# Patient Record
Sex: MALE | Race: BLACK OR AFRICAN AMERICAN | NOT HISPANIC OR LATINO | URBAN - METROPOLITAN AREA
[De-identification: names, ages, dates, MRNs, and addresses within clinical notes are randomized per-mention and may not be internally consistent; named-entity substitution may affect disease eponyms.]

---

## 2020-11-02 ENCOUNTER — OUTPATIENT (OUTPATIENT)
Dept: OUTPATIENT SERVICES | Facility: HOSPITAL | Age: 50
LOS: 1 days | Discharge: HOME | End: 2020-11-02
Payer: COMMERCIAL

## 2020-11-02 VITALS
DIASTOLIC BLOOD PRESSURE: 83 MMHG | WEIGHT: 315 LBS | TEMPERATURE: 98 F | HEART RATE: 73 BPM | HEIGHT: 74 IN | SYSTOLIC BLOOD PRESSURE: 138 MMHG | OXYGEN SATURATION: 100 %

## 2020-11-02 DIAGNOSIS — K43.6 OTHER AND UNSPECIFIED VENTRAL HERNIA WITH OBSTRUCTION, WITHOUT GANGRENE: ICD-10-CM

## 2020-11-02 DIAGNOSIS — I10 ESSENTIAL (PRIMARY) HYPERTENSION: ICD-10-CM

## 2020-11-02 DIAGNOSIS — Z01.818 ENCOUNTER FOR OTHER PREPROCEDURAL EXAMINATION: ICD-10-CM

## 2020-11-02 DIAGNOSIS — E66.9 OBESITY, UNSPECIFIED: ICD-10-CM

## 2020-11-02 LAB
ANION GAP SERPL CALC-SCNC: 7 MMOL/L — SIGNIFICANT CHANGE UP (ref 5–17)
APTT BLD: 28.5 SEC — SIGNIFICANT CHANGE UP (ref 27.5–35.5)
BUN SERPL-MCNC: 13 MG/DL — SIGNIFICANT CHANGE UP (ref 7–23)
CALCIUM SERPL-MCNC: 8.9 MG/DL — SIGNIFICANT CHANGE UP (ref 8.5–10.1)
CHLORIDE SERPL-SCNC: 109 MMOL/L — HIGH (ref 96–108)
CO2 SERPL-SCNC: 26 MMOL/L — SIGNIFICANT CHANGE UP (ref 22–31)
CREAT SERPL-MCNC: 1.08 MG/DL — SIGNIFICANT CHANGE UP (ref 0.5–1.3)
GLUCOSE SERPL-MCNC: 98 MG/DL — SIGNIFICANT CHANGE UP (ref 70–99)
HCT VFR BLD CALC: 42 % — SIGNIFICANT CHANGE UP (ref 39–50)
HGB BLD-MCNC: 13.5 G/DL — SIGNIFICANT CHANGE UP (ref 13–17)
INR BLD: 0.96 RATIO — SIGNIFICANT CHANGE UP (ref 0.88–1.16)
MCHC RBC-ENTMCNC: 30.2 PG — SIGNIFICANT CHANGE UP (ref 27–34)
MCHC RBC-ENTMCNC: 32.1 GM/DL — SIGNIFICANT CHANGE UP (ref 32–36)
MCV RBC AUTO: 94 FL — SIGNIFICANT CHANGE UP (ref 80–100)
NRBC # BLD: 0 /100 WBCS — SIGNIFICANT CHANGE UP (ref 0–0)
PLATELET # BLD AUTO: 218 K/UL — SIGNIFICANT CHANGE UP (ref 150–400)
POTASSIUM SERPL-MCNC: 4.3 MMOL/L — SIGNIFICANT CHANGE UP (ref 3.5–5.3)
POTASSIUM SERPL-SCNC: 4.3 MMOL/L — SIGNIFICANT CHANGE UP (ref 3.5–5.3)
PROTHROM AB SERPL-ACNC: 11.2 SEC — SIGNIFICANT CHANGE UP (ref 10.6–13.6)
RBC # BLD: 4.47 M/UL — SIGNIFICANT CHANGE UP (ref 4.2–5.8)
RBC # FLD: 13.4 % — SIGNIFICANT CHANGE UP (ref 10.3–14.5)
SODIUM SERPL-SCNC: 142 MMOL/L — SIGNIFICANT CHANGE UP (ref 135–145)
WBC # BLD: 6.14 K/UL — SIGNIFICANT CHANGE UP (ref 3.8–10.5)
WBC # FLD AUTO: 6.14 K/UL — SIGNIFICANT CHANGE UP (ref 3.8–10.5)

## 2020-11-02 PROCEDURE — 93010 ELECTROCARDIOGRAM REPORT: CPT

## 2020-11-02 RX ORDER — SODIUM CHLORIDE 9 MG/ML
3 INJECTION INTRAMUSCULAR; INTRAVENOUS; SUBCUTANEOUS EVERY 8 HOURS
Refills: 0 | Status: DISCONTINUED | OUTPATIENT
Start: 2020-11-16 | End: 2020-11-16

## 2020-11-02 NOTE — H&P PST ADULT - NSICDXPROBLEM_GEN_ALL_CORE_FT
No
PROBLEM DIAGNOSES  Problem: Other and unspecified ventral hernia with obstruction, without gangrene  Assessment and Plan: repair incarcerated ventral hernia with mesh  Pre-op instructions given by RN, patient verbalized understanding  Chlorhexidine wash instructions given     Problem: HTN (hypertension)  Assessment and Plan: Continue current regimen and medications.     Problem: Obesity  Assessment and Plan: KATHERINE percautions

## 2020-11-02 NOTE — H&P PST ADULT - REASON FOR ADMISSION
ventral hernia repair Post-Care Instructions: I reviewed with the patient in detail post-care instructions. Patient is to wear sunprotection, and avoid picking at any of the treated lesions. Pt may apply Vaseline to crusted or scabbing areas. Render Note In Bullet Format When Appropriate: No Number Of Freeze-Thaw Cycles: 1 freeze-thaw cycle Consent: The patient's consent was obtained including but not limited to risks of crusting, scabbing, blistering, scarring, darker or lighter pigmentary change, recurrence, incomplete removal and infection. Duration Of Freeze Thaw-Cycle (Seconds): 3 Detail Level: Simple

## 2020-11-02 NOTE — H&P PST ADULT - HISTORY OF PRESENT ILLNESS
50M pmh htn, obesity c/o abdominal discomfort and swelling fond to have ventral hernia here for PST for scheduled repair of incarcerated ventral hernia with mesh  This patient denies any fever, cough, sob, flu like symptoms or travel outside of the [place] in the past 30 days

## 2020-11-02 NOTE — H&P PST ADULT - ASSESSMENT
50M pmh htn, obesity c/o abdominal discomfort and swelling fond to have ventral hernia here for PST for scheduled repair of incarcerated ventral hernia with mesh  CAPRINI SCORE    AGE RELATED RISK FACTORS                                                       MOBILITY RELATED FACTORS  [x ] Age 41-60 years                                            (1 Point)                  [ ] Bed rest                                                        (1 Point)  [ ] Age: 61-74 years                                           (2 Points)                [ ] Plaster cast                                                   (2 Points)  [ ] Age= 75 years                                              (3 Points)                 [ ] Bed bound for more than 72 hours                   (2 Points)    DISEASE RELATED RISK FACTORS                                               GENDER SPECIFIC FACTORS  [ ] Edema in the lower extremities                       (1 Point)                  [ ] Pregnancy                                                     (1 Point)  [ ] Varicose veins                                               (1 Point)                  [ ] Post-partum < 6 weeks                                   (1 Point)             [ x] BMI > 25 Kg/m2                                            (1 Point)                  [ ] Hormonal therapy  or oral contraception            (1 Point)                 [ ] Sepsis (in the previous month)                        (1 Point)                  [ ] History of pregnancy complications  [ ] Pneumonia or serious lung disease                                               [ ] Unexplained or recurrent                       (1 Point)           (in the previous month)                               (1 Point)  [ ] Abnormal pulmonary function test                     (1 Point)                 SURGERY RELATED RISK FACTORS  [ ] Acute myocardial infarction                              (1 Point)                 [ ]  Section                                            (1 Point)  [ ] Congestive heart failure (in the previous month)  (1 Point)                 [ ] Minor surgery                                                 (1 Point)   [ ] Inflammatory bowel disease                             (1 Point)                 [ ] Arthroscopic surgery                                        (2 Points)  [ ] Central venous access                                    (2 Points)                [ x] General surgery lasting more than 45 minutes   (2 Points)       [ ] Stroke (in the previous month)                          (5 Points)               [ ] Elective arthroplasty                                        (5 Points)                                                                                                                                               HEMATOLOGY RELATED FACTORS                                                 TRAUMA RELATED RISK FACTORS  [ ] Prior episodes of VTE                                     (3 Points)                 [ ] Fracture of the hip, pelvis, or leg                       (5 Points)  [ ] Positive family history for VTE                         (3 Points)                 [ ] Acute spinal cord injury (in the previous month)  (5 Points)  [ ] Prothrombin 74663 A                                      (3 Points)                 [ ] Paralysis  (less than 1 month)                          (5 Points)  [ ] Factor V Leiden                                             (3 Points)                 [ ] Multiple Trauma within 1 month                         (5 Points)  [ ] Lupus anticoagulants                                     (3 Points)                                                           [ ] Anticardiolipin antibodies                                (3 Points)                                                       [ ] High homocysteine in the blood                      (3 Points)                                             [ ] Other congenital or acquired thrombophilia       (3 Points)                                                [ ] Heparin induced thrombocytopenia                  (3 Points)                                          Total Score [       4   ]

## 2020-11-08 DIAGNOSIS — Z01.818 ENCOUNTER FOR OTHER PREPROCEDURAL EXAMINATION: ICD-10-CM

## 2020-11-08 PROBLEM — Z00.00 ENCOUNTER FOR PREVENTIVE HEALTH EXAMINATION: Status: ACTIVE | Noted: 2020-11-08

## 2020-11-13 ENCOUNTER — APPOINTMENT (OUTPATIENT)
Dept: DISASTER EMERGENCY | Facility: CLINIC | Age: 50
End: 2020-11-13

## 2020-11-15 ENCOUNTER — TRANSCRIPTION ENCOUNTER (OUTPATIENT)
Age: 50
End: 2020-11-15

## 2020-11-15 LAB — SARS-COV-2 N GENE NPH QL NAA+PROBE: NOT DETECTED

## 2020-11-16 ENCOUNTER — OUTPATIENT (OUTPATIENT)
Dept: OUTPATIENT SERVICES | Facility: HOSPITAL | Age: 50
LOS: 1 days | Discharge: HOME | End: 2020-11-16
Payer: COMMERCIAL

## 2020-11-16 ENCOUNTER — RESULT REVIEW (OUTPATIENT)
Age: 50
End: 2020-11-16

## 2020-11-16 VITALS
TEMPERATURE: 98 F | SYSTOLIC BLOOD PRESSURE: 127 MMHG | WEIGHT: 315 LBS | HEIGHT: 74 IN | OXYGEN SATURATION: 100 % | HEART RATE: 77 BPM | DIASTOLIC BLOOD PRESSURE: 76 MMHG | RESPIRATION RATE: 20 BRPM

## 2020-11-16 VITALS
HEART RATE: 66 BPM | OXYGEN SATURATION: 98 % | RESPIRATION RATE: 18 BRPM | DIASTOLIC BLOOD PRESSURE: 79 MMHG | SYSTOLIC BLOOD PRESSURE: 142 MMHG

## 2020-11-16 PROCEDURE — 88302 TISSUE EXAM BY PATHOLOGIST: CPT | Mod: 26

## 2020-11-16 RX ORDER — FENTANYL CITRATE 50 UG/ML
25 INJECTION INTRAVENOUS
Refills: 0 | Status: DISCONTINUED | OUTPATIENT
Start: 2020-11-16 | End: 2020-11-16

## 2020-11-16 RX ORDER — CANDESARTAN CILEXETIL 8 MG/1
1 TABLET ORAL
Qty: 0 | Refills: 0 | DISCHARGE

## 2020-11-16 RX ORDER — SODIUM CHLORIDE 9 MG/ML
1000 INJECTION, SOLUTION INTRAVENOUS
Refills: 0 | Status: DISCONTINUED | OUTPATIENT
Start: 2020-11-16 | End: 2020-11-16

## 2020-11-16 RX ORDER — ONDANSETRON 8 MG/1
4 TABLET, FILM COATED ORAL ONCE
Refills: 0 | Status: DISCONTINUED | OUTPATIENT
Start: 2020-11-16 | End: 2020-11-16

## 2020-11-16 RX ORDER — ACETAMINOPHEN WITH CODEINE 300MG-30MG
1 TABLET ORAL
Qty: 14 | Refills: 0
Start: 2020-11-16

## 2020-11-16 RX ADMIN — FENTANYL CITRATE 25 MICROGRAM(S): 50 INJECTION INTRAVENOUS at 15:28

## 2020-11-16 RX ADMIN — FENTANYL CITRATE 25 MICROGRAM(S): 50 INJECTION INTRAVENOUS at 15:42

## 2020-11-16 RX ADMIN — SODIUM CHLORIDE 50 MILLILITER(S): 9 INJECTION, SOLUTION INTRAVENOUS at 15:29

## 2020-11-16 NOTE — ASU DISCHARGE PLAN (ADULT/PEDIATRIC) - CARE PROVIDER_API CALL
Laly Oliveira  SURGERY  [address], [address]  [place] [address]  Phone: [phone]  Fax: [phone]  Established Patient  Follow Up Time:

## 2020-11-18 LAB — SURGICAL PATHOLOGY STUDY: SIGNIFICANT CHANGE UP

## 2020-11-24 DIAGNOSIS — I10 ESSENTIAL (PRIMARY) HYPERTENSION: ICD-10-CM

## 2020-11-24 DIAGNOSIS — K43.6 OTHER AND UNSPECIFIED VENTRAL HERNIA WITH OBSTRUCTION, WITHOUT GANGRENE: ICD-10-CM

## 2020-11-24 DIAGNOSIS — E66.01 MORBID (SEVERE) OBESITY DUE TO EXCESS CALORIES: ICD-10-CM

## 2024-01-01 ENCOUNTER — INPATIENT (INPATIENT)
Facility: HOSPITAL | Age: 54
LOS: 1 days | DRG: 176 | End: 2024-05-21
Attending: STUDENT IN AN ORGANIZED HEALTH CARE EDUCATION/TRAINING PROGRAM | Admitting: INTERNAL MEDICINE
Payer: COMMERCIAL

## 2024-01-01 ENCOUNTER — RESULT REVIEW (OUTPATIENT)
Age: 54
End: 2024-01-01

## 2024-01-01 VITALS
WEIGHT: 248.9 LBS | HEART RATE: 112 BPM | HEIGHT: 74 IN | OXYGEN SATURATION: 91 % | RESPIRATION RATE: 24 BRPM | SYSTOLIC BLOOD PRESSURE: 146 MMHG | DIASTOLIC BLOOD PRESSURE: 93 MMHG | TEMPERATURE: 96 F

## 2024-01-01 DIAGNOSIS — R09.89 OTHER SPECIFIED SYMPTOMS AND SIGNS INVOLVING THE CIRCULATORY AND RESPIRATORY SYSTEMS: ICD-10-CM

## 2024-01-01 DIAGNOSIS — Z98.890 OTHER SPECIFIED POSTPROCEDURAL STATES: Chronic | ICD-10-CM

## 2024-01-01 DIAGNOSIS — I26.99 OTHER PULMONARY EMBOLISM WITHOUT ACUTE COR PULMONALE: ICD-10-CM

## 2024-01-01 LAB
A1C WITH ESTIMATED AVERAGE GLUCOSE RESULT: 5.8 % — HIGH (ref 4–5.6)
ADD ON TEST-SPECIMEN IN LAB: SIGNIFICANT CHANGE UP
ALBUMIN SERPL ELPH-MCNC: 3.1 G/DL — LOW (ref 3.3–5)
ALBUMIN SERPL ELPH-MCNC: 3.1 G/DL — LOW (ref 3.3–5)
ALBUMIN SERPL ELPH-MCNC: 3.2 G/DL — LOW (ref 3.3–5)
ALBUMIN SERPL ELPH-MCNC: 3.3 G/DL — SIGNIFICANT CHANGE UP (ref 3.3–5)
ALBUMIN SERPL ELPH-MCNC: 3.4 G/DL — SIGNIFICANT CHANGE UP (ref 3.3–5)
ALBUMIN SERPL ELPH-MCNC: 3.5 G/DL — SIGNIFICANT CHANGE UP (ref 3.3–5)
ALBUMIN SERPL ELPH-MCNC: 3.5 G/DL — SIGNIFICANT CHANGE UP (ref 3.3–5)
ALBUMIN SERPL ELPH-MCNC: 3.6 G/DL — SIGNIFICANT CHANGE UP (ref 3.3–5)
ALBUMIN SERPL ELPH-MCNC: 3.7 G/DL — SIGNIFICANT CHANGE UP (ref 3.3–5)
ALBUMIN SERPL ELPH-MCNC: 3.7 G/DL — SIGNIFICANT CHANGE UP (ref 3.3–5)
ALBUMIN SERPL ELPH-MCNC: 3.8 G/DL — SIGNIFICANT CHANGE UP (ref 3.3–5)
ALBUMIN SERPL ELPH-MCNC: 4 G/DL — SIGNIFICANT CHANGE UP (ref 3.3–5)
ALBUMIN SERPL ELPH-MCNC: 4 G/DL — SIGNIFICANT CHANGE UP (ref 3.3–5)
ALP SERPL-CCNC: 49 U/L — SIGNIFICANT CHANGE UP (ref 40–120)
ALP SERPL-CCNC: 50 U/L — SIGNIFICANT CHANGE UP (ref 40–120)
ALP SERPL-CCNC: 51 U/L — SIGNIFICANT CHANGE UP (ref 40–120)
ALP SERPL-CCNC: 54 U/L — SIGNIFICANT CHANGE UP (ref 40–120)
ALP SERPL-CCNC: 55 U/L — SIGNIFICANT CHANGE UP (ref 40–120)
ALP SERPL-CCNC: 57 U/L — SIGNIFICANT CHANGE UP (ref 40–120)
ALP SERPL-CCNC: 58 U/L — SIGNIFICANT CHANGE UP (ref 40–120)
ALP SERPL-CCNC: 63 U/L — SIGNIFICANT CHANGE UP (ref 40–120)
ALP SERPL-CCNC: 66 U/L — SIGNIFICANT CHANGE UP (ref 40–120)
ALP SERPL-CCNC: 72 U/L — SIGNIFICANT CHANGE UP (ref 40–120)
ALP SERPL-CCNC: 75 U/L — SIGNIFICANT CHANGE UP (ref 40–120)
ALP SERPL-CCNC: 79 U/L — SIGNIFICANT CHANGE UP (ref 40–120)
ALP SERPL-CCNC: 80 U/L — SIGNIFICANT CHANGE UP (ref 40–120)
ALT FLD-CCNC: 17 U/L — SIGNIFICANT CHANGE UP (ref 10–45)
ALT FLD-CCNC: 18 U/L — SIGNIFICANT CHANGE UP (ref 10–45)
ALT FLD-CCNC: 19 U/L — SIGNIFICANT CHANGE UP (ref 10–45)
ALT FLD-CCNC: 19 U/L — SIGNIFICANT CHANGE UP (ref 10–45)
ALT FLD-CCNC: 20 U/L — SIGNIFICANT CHANGE UP (ref 10–45)
ALT FLD-CCNC: 21 U/L — SIGNIFICANT CHANGE UP (ref 10–45)
ALT FLD-CCNC: 21 U/L — SIGNIFICANT CHANGE UP (ref 10–45)
ALT FLD-CCNC: 22 U/L — SIGNIFICANT CHANGE UP (ref 10–45)
ANION GAP SERPL CALC-SCNC: 10 MMOL/L — SIGNIFICANT CHANGE UP (ref 5–17)
ANION GAP SERPL CALC-SCNC: 11 MMOL/L — SIGNIFICANT CHANGE UP (ref 5–17)
ANION GAP SERPL CALC-SCNC: 12 MMOL/L — SIGNIFICANT CHANGE UP (ref 5–17)
ANION GAP SERPL CALC-SCNC: 13 MMOL/L — SIGNIFICANT CHANGE UP (ref 5–17)
ANION GAP SERPL CALC-SCNC: 14 MMOL/L — SIGNIFICANT CHANGE UP (ref 5–17)
ANION GAP SERPL CALC-SCNC: 14 MMOL/L — SIGNIFICANT CHANGE UP (ref 5–17)
ANION GAP SERPL CALC-SCNC: 16 MMOL/L — SIGNIFICANT CHANGE UP (ref 5–17)
APPEARANCE UR: CLEAR — SIGNIFICANT CHANGE UP
APTT BLD: 102 SEC — HIGH (ref 24.5–35.6)
APTT BLD: 22 SEC — LOW (ref 24.5–35.6)
APTT BLD: 24.4 SEC — LOW (ref 24.5–35.6)
APTT BLD: 27.3 SEC — SIGNIFICANT CHANGE UP (ref 24.5–35.6)
AST SERPL-CCNC: 16 U/L — SIGNIFICANT CHANGE UP (ref 10–40)
AST SERPL-CCNC: 16 U/L — SIGNIFICANT CHANGE UP (ref 10–40)
AST SERPL-CCNC: 17 U/L — SIGNIFICANT CHANGE UP (ref 10–40)
AST SERPL-CCNC: 19 U/L — SIGNIFICANT CHANGE UP (ref 10–40)
AST SERPL-CCNC: 21 U/L — SIGNIFICANT CHANGE UP (ref 10–40)
AST SERPL-CCNC: 22 U/L — SIGNIFICANT CHANGE UP (ref 10–40)
AST SERPL-CCNC: 22 U/L — SIGNIFICANT CHANGE UP (ref 10–40)
AST SERPL-CCNC: 23 U/L — SIGNIFICANT CHANGE UP (ref 10–40)
AST SERPL-CCNC: 23 U/L — SIGNIFICANT CHANGE UP (ref 10–40)
AST SERPL-CCNC: 24 U/L — SIGNIFICANT CHANGE UP (ref 10–40)
AST SERPL-CCNC: 24 U/L — SIGNIFICANT CHANGE UP (ref 10–40)
AST SERPL-CCNC: 27 U/L — SIGNIFICANT CHANGE UP (ref 10–40)
AST SERPL-CCNC: 27 U/L — SIGNIFICANT CHANGE UP (ref 10–40)
BACTERIA # UR AUTO: NEGATIVE /HPF — SIGNIFICANT CHANGE UP
BASE EXCESS BLDV CALC-SCNC: -14.1 MMOL/L — LOW (ref -2–3)
BASOPHILS # BLD AUTO: 0.05 K/UL — SIGNIFICANT CHANGE UP (ref 0–0.2)
BASOPHILS # BLD AUTO: 0.06 K/UL — SIGNIFICANT CHANGE UP (ref 0–0.2)
BASOPHILS NFR BLD AUTO: 0.4 % — SIGNIFICANT CHANGE UP (ref 0–2)
BASOPHILS NFR BLD AUTO: 0.4 % — SIGNIFICANT CHANGE UP (ref 0–2)
BILIRUB SERPL-MCNC: 1.4 MG/DL — HIGH (ref 0.2–1.2)
BILIRUB SERPL-MCNC: 1.5 MG/DL — HIGH (ref 0.2–1.2)
BILIRUB SERPL-MCNC: 1.7 MG/DL — HIGH (ref 0.2–1.2)
BILIRUB SERPL-MCNC: 1.9 MG/DL — HIGH (ref 0.2–1.2)
BILIRUB SERPL-MCNC: 2 MG/DL — HIGH (ref 0.2–1.2)
BILIRUB SERPL-MCNC: 2 MG/DL — HIGH (ref 0.2–1.2)
BILIRUB SERPL-MCNC: 2.1 MG/DL — HIGH (ref 0.2–1.2)
BILIRUB SERPL-MCNC: 2.3 MG/DL — HIGH (ref 0.2–1.2)
BILIRUB SERPL-MCNC: 2.5 MG/DL — HIGH (ref 0.2–1.2)
BILIRUB SERPL-MCNC: 2.5 MG/DL — HIGH (ref 0.2–1.2)
BILIRUB SERPL-MCNC: 2.6 MG/DL — HIGH (ref 0.2–1.2)
BILIRUB UR-MCNC: NEGATIVE — SIGNIFICANT CHANGE UP
BLD GP AB SCN SERPL QL: NEGATIVE — SIGNIFICANT CHANGE UP
BUN SERPL-MCNC: 10 MG/DL — SIGNIFICANT CHANGE UP (ref 7–23)
BUN SERPL-MCNC: 11 MG/DL — SIGNIFICANT CHANGE UP (ref 7–23)
BUN SERPL-MCNC: 11 MG/DL — SIGNIFICANT CHANGE UP (ref 7–23)
BUN SERPL-MCNC: 12 MG/DL — SIGNIFICANT CHANGE UP (ref 7–23)
BUN SERPL-MCNC: 13 MG/DL — SIGNIFICANT CHANGE UP (ref 7–23)
CA-I SERPL-SCNC: 1.15 MMOL/L — SIGNIFICANT CHANGE UP (ref 1.15–1.33)
CALCIUM SERPL-MCNC: 8.4 MG/DL — SIGNIFICANT CHANGE UP (ref 8.4–10.5)
CALCIUM SERPL-MCNC: 8.5 MG/DL — SIGNIFICANT CHANGE UP (ref 8.4–10.5)
CALCIUM SERPL-MCNC: 8.6 MG/DL — SIGNIFICANT CHANGE UP (ref 8.4–10.5)
CALCIUM SERPL-MCNC: 8.7 MG/DL — SIGNIFICANT CHANGE UP (ref 8.4–10.5)
CALCIUM SERPL-MCNC: 8.8 MG/DL — SIGNIFICANT CHANGE UP (ref 8.4–10.5)
CALCIUM SERPL-MCNC: 8.9 MG/DL — SIGNIFICANT CHANGE UP (ref 8.4–10.5)
CALCIUM SERPL-MCNC: 9 MG/DL — SIGNIFICANT CHANGE UP (ref 8.4–10.5)
CALCIUM SERPL-MCNC: 9.3 MG/DL — SIGNIFICANT CHANGE UP (ref 8.4–10.5)
CAST: 1 /LPF — SIGNIFICANT CHANGE UP (ref 0–4)
CHLORIDE BLDV-SCNC: 107 MMOL/L — SIGNIFICANT CHANGE UP (ref 96–108)
CHLORIDE SERPL-SCNC: 107 MMOL/L — SIGNIFICANT CHANGE UP (ref 96–108)
CHLORIDE SERPL-SCNC: 108 MMOL/L — SIGNIFICANT CHANGE UP (ref 96–108)
CHLORIDE SERPL-SCNC: 113 MMOL/L — HIGH (ref 96–108)
CHLORIDE SERPL-SCNC: 114 MMOL/L — HIGH (ref 96–108)
CHLORIDE SERPL-SCNC: 115 MMOL/L — HIGH (ref 96–108)
CHLORIDE SERPL-SCNC: 118 MMOL/L — HIGH (ref 96–108)
CHLORIDE SERPL-SCNC: 119 MMOL/L — HIGH (ref 96–108)
CHLORIDE SERPL-SCNC: 120 MMOL/L — HIGH (ref 96–108)
CHLORIDE SERPL-SCNC: 122 MMOL/L — HIGH (ref 96–108)
CHLORIDE SERPL-SCNC: 122 MMOL/L — HIGH (ref 96–108)
CHLORIDE SERPL-SCNC: 123 MMOL/L — HIGH (ref 96–108)
CHLORIDE SERPL-SCNC: 123 MMOL/L — HIGH (ref 96–108)
CHLORIDE SERPL-SCNC: 125 MMOL/L — HIGH (ref 96–108)
CHOLEST SERPL-MCNC: 175 MG/DL — SIGNIFICANT CHANGE UP
CO2 BLDV-SCNC: 17 MMOL/L — LOW (ref 22–26)
CO2 SERPL-SCNC: 16 MMOL/L — LOW (ref 22–31)
CO2 SERPL-SCNC: 16 MMOL/L — LOW (ref 22–31)
CO2 SERPL-SCNC: 18 MMOL/L — LOW (ref 22–31)
CO2 SERPL-SCNC: 20 MMOL/L — LOW (ref 22–31)
CO2 SERPL-SCNC: 21 MMOL/L — LOW (ref 22–31)
CO2 SERPL-SCNC: 22 MMOL/L — SIGNIFICANT CHANGE UP (ref 22–31)
COLOR SPEC: YELLOW — SIGNIFICANT CHANGE UP
CREAT ?TM UR-MCNC: 94 MG/DL — SIGNIFICANT CHANGE UP
CREAT SERPL-MCNC: 1 MG/DL — SIGNIFICANT CHANGE UP (ref 0.5–1.3)
CREAT SERPL-MCNC: 1.14 MG/DL — SIGNIFICANT CHANGE UP (ref 0.5–1.3)
CREAT SERPL-MCNC: 1.29 MG/DL — SIGNIFICANT CHANGE UP (ref 0.5–1.3)
CREAT SERPL-MCNC: 1.34 MG/DL — HIGH (ref 0.5–1.3)
CREAT SERPL-MCNC: 1.36 MG/DL — HIGH (ref 0.5–1.3)
CREAT SERPL-MCNC: 1.45 MG/DL — HIGH (ref 0.5–1.3)
CREAT SERPL-MCNC: 1.51 MG/DL — HIGH (ref 0.5–1.3)
CREAT SERPL-MCNC: 1.52 MG/DL — HIGH (ref 0.5–1.3)
CREAT SERPL-MCNC: 1.53 MG/DL — HIGH (ref 0.5–1.3)
CREAT SERPL-MCNC: 1.56 MG/DL — HIGH (ref 0.5–1.3)
CREAT SERPL-MCNC: 1.57 MG/DL — HIGH (ref 0.5–1.3)
CREAT SERPL-MCNC: 1.59 MG/DL — HIGH (ref 0.5–1.3)
CREAT SERPL-MCNC: 1.69 MG/DL — HIGH (ref 0.5–1.3)
D DIMER BLD IA.RAPID-MCNC: HIGH NG/ML DDU
DIFF PNL FLD: ABNORMAL
EGFR: 47 ML/MIN/1.73M2 — LOW
EGFR: 51 ML/MIN/1.73M2 — LOW
EGFR: 52 ML/MIN/1.73M2 — LOW
EGFR: 52 ML/MIN/1.73M2 — LOW
EGFR: 53 ML/MIN/1.73M2 — LOW
EGFR: 54 ML/MIN/1.73M2 — LOW
EGFR: 54 ML/MIN/1.73M2 — LOW
EGFR: 57 ML/MIN/1.73M2 — LOW
EGFR: 61 ML/MIN/1.73M2 — SIGNIFICANT CHANGE UP
EGFR: 63 ML/MIN/1.73M2 — SIGNIFICANT CHANGE UP
EGFR: 65 ML/MIN/1.73M2 — SIGNIFICANT CHANGE UP
EGFR: 76 ML/MIN/1.73M2 — SIGNIFICANT CHANGE UP
EGFR: 89 ML/MIN/1.73M2 — SIGNIFICANT CHANGE UP
EOSINOPHIL # BLD AUTO: 0 K/UL — SIGNIFICANT CHANGE UP (ref 0–0.5)
EOSINOPHIL # BLD AUTO: 0.01 K/UL — SIGNIFICANT CHANGE UP (ref 0–0.5)
EOSINOPHIL NFR BLD AUTO: 0 % — SIGNIFICANT CHANGE UP (ref 0–6)
EOSINOPHIL NFR BLD AUTO: 0.1 % — SIGNIFICANT CHANGE UP (ref 0–6)
ESTIMATED AVERAGE GLUCOSE: 120 MG/DL — HIGH (ref 68–114)
FIBRINOGEN PPP-MCNC: 63 MG/DL — CRITICAL LOW (ref 200–445)
FIBRINOGEN PPP-MCNC: 66 MG/DL — CRITICAL LOW (ref 200–445)
GAS PNL BLDA: SIGNIFICANT CHANGE UP
GAS PNL BLDV: 139 MMOL/L — SIGNIFICANT CHANGE UP (ref 136–145)
GAS PNL BLDV: SIGNIFICANT CHANGE UP
GAS PNL BLDV: SIGNIFICANT CHANGE UP
GLUCOSE BLDV-MCNC: 233 MG/DL — HIGH (ref 70–99)
GLUCOSE SERPL-MCNC: 116 MG/DL — HIGH (ref 70–99)
GLUCOSE SERPL-MCNC: 147 MG/DL — HIGH (ref 70–99)
GLUCOSE SERPL-MCNC: 163 MG/DL — HIGH (ref 70–99)
GLUCOSE SERPL-MCNC: 164 MG/DL — HIGH (ref 70–99)
GLUCOSE SERPL-MCNC: 168 MG/DL — HIGH (ref 70–99)
GLUCOSE SERPL-MCNC: 171 MG/DL — HIGH (ref 70–99)
GLUCOSE SERPL-MCNC: 174 MG/DL — HIGH (ref 70–99)
GLUCOSE SERPL-MCNC: 180 MG/DL — HIGH (ref 70–99)
GLUCOSE SERPL-MCNC: 181 MG/DL — HIGH (ref 70–99)
GLUCOSE SERPL-MCNC: 188 MG/DL — HIGH (ref 70–99)
GLUCOSE SERPL-MCNC: 209 MG/DL — HIGH (ref 70–99)
GLUCOSE SERPL-MCNC: 210 MG/DL — HIGH (ref 70–99)
GLUCOSE SERPL-MCNC: 217 MG/DL — HIGH (ref 70–99)
GLUCOSE UR QL: NEGATIVE MG/DL — SIGNIFICANT CHANGE UP
HCO3 BLDV-SCNC: 15 MMOL/L — LOW (ref 22–29)
HCT VFR BLD CALC: 34.5 % — LOW (ref 39–50)
HCT VFR BLD CALC: 37.3 % — LOW (ref 39–50)
HCT VFR BLD CALC: 38.3 % — LOW (ref 39–50)
HCT VFR BLD CALC: 40.2 % — SIGNIFICANT CHANGE UP (ref 39–50)
HCT VFR BLD CALC: 41.5 % — SIGNIFICANT CHANGE UP (ref 39–50)
HCT VFR BLDA CALC: 38 % — LOW (ref 39–51)
HDLC SERPL-MCNC: 61 MG/DL — SIGNIFICANT CHANGE UP
HGB BLD CALC-MCNC: 12.6 G/DL — SIGNIFICANT CHANGE UP (ref 12.6–17.4)
HGB BLD-MCNC: 11 G/DL — LOW (ref 13–17)
HGB BLD-MCNC: 11.9 G/DL — LOW (ref 13–17)
HGB BLD-MCNC: 12.7 G/DL — LOW (ref 13–17)
HGB BLD-MCNC: 13.4 G/DL — SIGNIFICANT CHANGE UP (ref 13–17)
HGB BLD-MCNC: 13.4 G/DL — SIGNIFICANT CHANGE UP (ref 13–17)
IMM GRANULOCYTES NFR BLD AUTO: 0.2 % — SIGNIFICANT CHANGE UP (ref 0–0.9)
IMM GRANULOCYTES NFR BLD AUTO: 0.9 % — SIGNIFICANT CHANGE UP (ref 0–0.9)
INR BLD: 1.22 RATIO — HIGH (ref 0.85–1.18)
INR BLD: 1.26 RATIO — HIGH (ref 0.85–1.18)
INR BLD: 1.3 RATIO — HIGH (ref 0.85–1.18)
INR BLD: 1.44 RATIO — HIGH (ref 0.85–1.18)
KETONES UR-MCNC: NEGATIVE MG/DL — SIGNIFICANT CHANGE UP
LACTATE BLDV-MCNC: 8.1 MMOL/L — CRITICAL HIGH (ref 0.5–2)
LACTATE SERPL-SCNC: 5 MMOL/L — CRITICAL HIGH (ref 0.5–2)
LEUKOCYTE ESTERASE UR-ACNC: NEGATIVE — SIGNIFICANT CHANGE UP
LIPID PNL WITH DIRECT LDL SERPL: 107 MG/DL — HIGH
LYMPHOCYTES # BLD AUTO: 0.64 K/UL — LOW (ref 1–3.3)
LYMPHOCYTES # BLD AUTO: 1.09 K/UL — SIGNIFICANT CHANGE UP (ref 1–3.3)
LYMPHOCYTES # BLD AUTO: 4.1 % — LOW (ref 13–44)
LYMPHOCYTES # BLD AUTO: 9 % — LOW (ref 13–44)
MAGNESIUM SERPL-MCNC: 1.8 MG/DL — SIGNIFICANT CHANGE UP (ref 1.6–2.6)
MAGNESIUM SERPL-MCNC: 2.1 MG/DL — SIGNIFICANT CHANGE UP (ref 1.6–2.6)
MAGNESIUM SERPL-MCNC: 2.1 MG/DL — SIGNIFICANT CHANGE UP (ref 1.6–2.6)
MAGNESIUM SERPL-MCNC: 2.2 MG/DL — SIGNIFICANT CHANGE UP (ref 1.6–2.6)
MAGNESIUM SERPL-MCNC: 2.3 MG/DL — SIGNIFICANT CHANGE UP (ref 1.6–2.6)
MAGNESIUM SERPL-MCNC: 2.4 MG/DL — SIGNIFICANT CHANGE UP (ref 1.6–2.6)
MAGNESIUM SERPL-MCNC: 2.5 MG/DL — SIGNIFICANT CHANGE UP (ref 1.6–2.6)
MAGNESIUM SERPL-MCNC: 2.5 MG/DL — SIGNIFICANT CHANGE UP (ref 1.6–2.6)
MAGNESIUM SERPL-MCNC: 2.6 MG/DL — SIGNIFICANT CHANGE UP (ref 1.6–2.6)
MCHC RBC-ENTMCNC: 30.9 PG — SIGNIFICANT CHANGE UP (ref 27–34)
MCHC RBC-ENTMCNC: 31 PG — SIGNIFICANT CHANGE UP (ref 27–34)
MCHC RBC-ENTMCNC: 31.1 PG — SIGNIFICANT CHANGE UP (ref 27–34)
MCHC RBC-ENTMCNC: 31.3 PG — SIGNIFICANT CHANGE UP (ref 27–34)
MCHC RBC-ENTMCNC: 31.5 PG — SIGNIFICANT CHANGE UP (ref 27–34)
MCHC RBC-ENTMCNC: 31.9 GM/DL — LOW (ref 32–36)
MCHC RBC-ENTMCNC: 31.9 GM/DL — LOW (ref 32–36)
MCHC RBC-ENTMCNC: 32.3 GM/DL — SIGNIFICANT CHANGE UP (ref 32–36)
MCHC RBC-ENTMCNC: 33.2 GM/DL — SIGNIFICANT CHANGE UP (ref 32–36)
MCHC RBC-ENTMCNC: 33.3 GM/DL — SIGNIFICANT CHANGE UP (ref 32–36)
MCV RBC AUTO: 93.4 FL — SIGNIFICANT CHANGE UP (ref 80–100)
MCV RBC AUTO: 94.6 FL — SIGNIFICANT CHANGE UP (ref 80–100)
MCV RBC AUTO: 95.8 FL — SIGNIFICANT CHANGE UP (ref 80–100)
MCV RBC AUTO: 97.4 FL — SIGNIFICANT CHANGE UP (ref 80–100)
MCV RBC AUTO: 98 FL — SIGNIFICANT CHANGE UP (ref 80–100)
MONOCYTES # BLD AUTO: 0.79 K/UL — SIGNIFICANT CHANGE UP (ref 0–0.9)
MONOCYTES # BLD AUTO: 1.09 K/UL — HIGH (ref 0–0.9)
MONOCYTES NFR BLD AUTO: 6.5 % — SIGNIFICANT CHANGE UP (ref 2–14)
MONOCYTES NFR BLD AUTO: 7.1 % — SIGNIFICANT CHANGE UP (ref 2–14)
MRSA PCR RESULT.: SIGNIFICANT CHANGE UP
NEUTROPHILS # BLD AUTO: 10.13 K/UL — HIGH (ref 1.8–7.4)
NEUTROPHILS # BLD AUTO: 13.5 K/UL — HIGH (ref 1.8–7.4)
NEUTROPHILS NFR BLD AUTO: 83.8 % — HIGH (ref 43–77)
NEUTROPHILS NFR BLD AUTO: 87.5 % — HIGH (ref 43–77)
NITRITE UR-MCNC: NEGATIVE — SIGNIFICANT CHANGE UP
NON HDL CHOLESTEROL: 115 MG/DL — SIGNIFICANT CHANGE UP
NRBC # BLD: 0 /100 WBCS — SIGNIFICANT CHANGE UP (ref 0–0)
OSMOLALITY UR: 504 MOS/KG — SIGNIFICANT CHANGE UP (ref 300–900)
OTHER CELLS CSF MANUAL: 14.3 ML/DL — LOW (ref 18–22)
PCO2 BLDV: 48 MMHG — SIGNIFICANT CHANGE UP (ref 42–55)
PH BLDV: 7.11 — CRITICAL LOW (ref 7.32–7.43)
PH UR: 5 — SIGNIFICANT CHANGE UP (ref 5–8)
PHOSPHATE SERPL-MCNC: 1.7 MG/DL — LOW (ref 2.5–4.5)
PHOSPHATE SERPL-MCNC: 2.2 MG/DL — LOW (ref 2.5–4.5)
PHOSPHATE SERPL-MCNC: 2.2 MG/DL — LOW (ref 2.5–4.5)
PHOSPHATE SERPL-MCNC: 2.3 MG/DL — LOW (ref 2.5–4.5)
PHOSPHATE SERPL-MCNC: 2.4 MG/DL — LOW (ref 2.5–4.5)
PHOSPHATE SERPL-MCNC: 2.5 MG/DL — SIGNIFICANT CHANGE UP (ref 2.5–4.5)
PHOSPHATE SERPL-MCNC: 2.6 MG/DL — SIGNIFICANT CHANGE UP (ref 2.5–4.5)
PHOSPHATE SERPL-MCNC: 2.6 MG/DL — SIGNIFICANT CHANGE UP (ref 2.5–4.5)
PHOSPHATE SERPL-MCNC: 2.9 MG/DL — SIGNIFICANT CHANGE UP (ref 2.5–4.5)
PHOSPHATE SERPL-MCNC: 3.1 MG/DL — SIGNIFICANT CHANGE UP (ref 2.5–4.5)
PHOSPHATE SERPL-MCNC: 3.5 MG/DL — SIGNIFICANT CHANGE UP (ref 2.5–4.5)
PLATELET # BLD AUTO: 125 K/UL — LOW (ref 150–400)
PLATELET # BLD AUTO: 126 K/UL — LOW (ref 150–400)
PLATELET # BLD AUTO: 127 K/UL — LOW (ref 150–400)
PLATELET # BLD AUTO: 79 K/UL — LOW (ref 150–400)
PLATELET # BLD AUTO: 99 K/UL — LOW (ref 150–400)
PO2 BLDV: 51 MMHG — HIGH (ref 25–45)
POTASSIUM BLDV-SCNC: 3.6 MMOL/L — SIGNIFICANT CHANGE UP (ref 3.5–5.1)
POTASSIUM SERPL-MCNC: 3.4 MMOL/L — LOW (ref 3.5–5.3)
POTASSIUM SERPL-MCNC: 3.4 MMOL/L — LOW (ref 3.5–5.3)
POTASSIUM SERPL-MCNC: 3.5 MMOL/L — SIGNIFICANT CHANGE UP (ref 3.5–5.3)
POTASSIUM SERPL-MCNC: 3.5 MMOL/L — SIGNIFICANT CHANGE UP (ref 3.5–5.3)
POTASSIUM SERPL-MCNC: 3.6 MMOL/L — SIGNIFICANT CHANGE UP (ref 3.5–5.3)
POTASSIUM SERPL-MCNC: 3.7 MMOL/L — SIGNIFICANT CHANGE UP (ref 3.5–5.3)
POTASSIUM SERPL-MCNC: 3.9 MMOL/L — SIGNIFICANT CHANGE UP (ref 3.5–5.3)
POTASSIUM SERPL-MCNC: 4 MMOL/L — SIGNIFICANT CHANGE UP (ref 3.5–5.3)
POTASSIUM SERPL-MCNC: 4.1 MMOL/L — SIGNIFICANT CHANGE UP (ref 3.5–5.3)
POTASSIUM SERPL-MCNC: 4.3 MMOL/L — SIGNIFICANT CHANGE UP (ref 3.5–5.3)
POTASSIUM SERPL-MCNC: 4.4 MMOL/L — SIGNIFICANT CHANGE UP (ref 3.5–5.3)
POTASSIUM SERPL-MCNC: 4.5 MMOL/L — SIGNIFICANT CHANGE UP (ref 3.5–5.3)
POTASSIUM SERPL-MCNC: 4.8 MMOL/L — SIGNIFICANT CHANGE UP (ref 3.5–5.3)
POTASSIUM SERPL-SCNC: 3.4 MMOL/L — LOW (ref 3.5–5.3)
POTASSIUM SERPL-SCNC: 3.4 MMOL/L — LOW (ref 3.5–5.3)
POTASSIUM SERPL-SCNC: 3.5 MMOL/L — SIGNIFICANT CHANGE UP (ref 3.5–5.3)
POTASSIUM SERPL-SCNC: 3.5 MMOL/L — SIGNIFICANT CHANGE UP (ref 3.5–5.3)
POTASSIUM SERPL-SCNC: 3.6 MMOL/L — SIGNIFICANT CHANGE UP (ref 3.5–5.3)
POTASSIUM SERPL-SCNC: 3.7 MMOL/L — SIGNIFICANT CHANGE UP (ref 3.5–5.3)
POTASSIUM SERPL-SCNC: 3.9 MMOL/L — SIGNIFICANT CHANGE UP (ref 3.5–5.3)
POTASSIUM SERPL-SCNC: 4 MMOL/L — SIGNIFICANT CHANGE UP (ref 3.5–5.3)
POTASSIUM SERPL-SCNC: 4.1 MMOL/L — SIGNIFICANT CHANGE UP (ref 3.5–5.3)
POTASSIUM SERPL-SCNC: 4.3 MMOL/L — SIGNIFICANT CHANGE UP (ref 3.5–5.3)
POTASSIUM SERPL-SCNC: 4.4 MMOL/L — SIGNIFICANT CHANGE UP (ref 3.5–5.3)
POTASSIUM SERPL-SCNC: 4.5 MMOL/L — SIGNIFICANT CHANGE UP (ref 3.5–5.3)
POTASSIUM SERPL-SCNC: 4.8 MMOL/L — SIGNIFICANT CHANGE UP (ref 3.5–5.3)
PROCALCITONIN SERPL-MCNC: 0.98 NG/ML — HIGH (ref 0.02–0.1)
PROT SERPL-MCNC: 6 G/DL — SIGNIFICANT CHANGE UP (ref 6–8.3)
PROT SERPL-MCNC: 6 G/DL — SIGNIFICANT CHANGE UP (ref 6–8.3)
PROT SERPL-MCNC: 6.2 G/DL — SIGNIFICANT CHANGE UP (ref 6–8.3)
PROT SERPL-MCNC: 6.3 G/DL — SIGNIFICANT CHANGE UP (ref 6–8.3)
PROT SERPL-MCNC: 6.5 G/DL — SIGNIFICANT CHANGE UP (ref 6–8.3)
PROT SERPL-MCNC: 6.6 G/DL — SIGNIFICANT CHANGE UP (ref 6–8.3)
PROT SERPL-MCNC: 6.6 G/DL — SIGNIFICANT CHANGE UP (ref 6–8.3)
PROT SERPL-MCNC: 6.9 G/DL — SIGNIFICANT CHANGE UP (ref 6–8.3)
PROT SERPL-MCNC: 6.9 G/DL — SIGNIFICANT CHANGE UP (ref 6–8.3)
PROT SERPL-MCNC: 7.1 G/DL — SIGNIFICANT CHANGE UP (ref 6–8.3)
PROT SERPL-MCNC: 7.3 G/DL — SIGNIFICANT CHANGE UP (ref 6–8.3)
PROT UR-MCNC: NEGATIVE MG/DL — SIGNIFICANT CHANGE UP
PROTHROM AB SERPL-ACNC: 13.3 SEC — HIGH (ref 9.5–13)
PROTHROM AB SERPL-ACNC: 13.8 SEC — HIGH (ref 9.5–13)
PROTHROM AB SERPL-ACNC: 14.2 SEC — HIGH (ref 9.5–13)
PROTHROM AB SERPL-ACNC: 15 SEC — HIGH (ref 9.5–13)
RBC # BLD: 3.52 M/UL — LOW (ref 4.2–5.8)
RBC # BLD: 3.83 M/UL — LOW (ref 4.2–5.8)
RBC # BLD: 4.1 M/UL — LOW (ref 4.2–5.8)
RBC # BLD: 4.25 M/UL — SIGNIFICANT CHANGE UP (ref 4.2–5.8)
RBC # BLD: 4.33 M/UL — SIGNIFICANT CHANGE UP (ref 4.2–5.8)
RBC # FLD: 13.5 % — SIGNIFICANT CHANGE UP (ref 10.3–14.5)
RBC # FLD: 13.5 % — SIGNIFICANT CHANGE UP (ref 10.3–14.5)
RBC # FLD: 13.8 % — SIGNIFICANT CHANGE UP (ref 10.3–14.5)
RBC # FLD: 14.5 % — SIGNIFICANT CHANGE UP (ref 10.3–14.5)
RBC # FLD: 14.5 % — SIGNIFICANT CHANGE UP (ref 10.3–14.5)
RBC CASTS # UR COMP ASSIST: 11 /HPF — HIGH (ref 0–4)
RH IG SCN BLD-IMP: POSITIVE — SIGNIFICANT CHANGE UP
S AUREUS DNA NOSE QL NAA+PROBE: SIGNIFICANT CHANGE UP
SAO2 % BLDV: 78.9 % — SIGNIFICANT CHANGE UP (ref 67–88)
SODIUM SERPL-SCNC: 137 MMOL/L — SIGNIFICANT CHANGE UP (ref 135–145)
SODIUM SERPL-SCNC: 140 MMOL/L — SIGNIFICANT CHANGE UP (ref 135–145)
SODIUM SERPL-SCNC: 145 MMOL/L — SIGNIFICANT CHANGE UP (ref 135–145)
SODIUM SERPL-SCNC: 146 MMOL/L — HIGH (ref 135–145)
SODIUM SERPL-SCNC: 147 MMOL/L — HIGH (ref 135–145)
SODIUM SERPL-SCNC: 151 MMOL/L — HIGH (ref 135–145)
SODIUM SERPL-SCNC: 154 MMOL/L — HIGH (ref 135–145)
SODIUM SERPL-SCNC: 155 MMOL/L — HIGH (ref 135–145)
SODIUM SERPL-SCNC: 158 MMOL/L — HIGH (ref 135–145)
SODIUM UR-SCNC: 82 MMOL/L — SIGNIFICANT CHANGE UP
SP GR SPEC: 1.02 — SIGNIFICANT CHANGE UP (ref 1–1.03)
SQUAMOUS # UR AUTO: 2 /HPF — SIGNIFICANT CHANGE UP (ref 0–5)
TRIGL SERPL-MCNC: 36 MG/DL — SIGNIFICANT CHANGE UP
TROPONIN T, HIGH SENSITIVITY RESULT: 469 NG/L — HIGH (ref 0–51)
TSH SERPL-MCNC: 0.61 UIU/ML — SIGNIFICANT CHANGE UP (ref 0.27–4.2)
UROBILINOGEN FLD QL: 0.2 MG/DL — SIGNIFICANT CHANGE UP (ref 0.2–1)
WBC # BLD: 12.1 K/UL — HIGH (ref 3.8–10.5)
WBC # BLD: 12.23 K/UL — HIGH (ref 3.8–10.5)
WBC # BLD: 12.63 K/UL — HIGH (ref 3.8–10.5)
WBC # BLD: 12.76 K/UL — HIGH (ref 3.8–10.5)
WBC # BLD: 15.43 K/UL — HIGH (ref 3.8–10.5)
WBC # FLD AUTO: 12.1 K/UL — HIGH (ref 3.8–10.5)
WBC # FLD AUTO: 12.23 K/UL — HIGH (ref 3.8–10.5)
WBC # FLD AUTO: 12.63 K/UL — HIGH (ref 3.8–10.5)
WBC # FLD AUTO: 12.76 K/UL — HIGH (ref 3.8–10.5)
WBC # FLD AUTO: 15.43 K/UL — HIGH (ref 3.8–10.5)
WBC UR QL: 3 /HPF — SIGNIFICANT CHANGE UP (ref 0–5)

## 2024-01-01 PROCEDURE — 85379 FIBRIN DEGRADATION QUANT: CPT

## 2024-01-01 PROCEDURE — 76937 US GUIDE VASCULAR ACCESS: CPT | Mod: 26

## 2024-01-01 PROCEDURE — 81001 URINALYSIS AUTO W/SCOPE: CPT

## 2024-01-01 PROCEDURE — 94002 VENT MGMT INPAT INIT DAY: CPT

## 2024-01-01 PROCEDURE — 87641 MR-STAPH DNA AMP PROBE: CPT

## 2024-01-01 PROCEDURE — 93356 MYOCRD STRAIN IMG SPCKL TRCK: CPT

## 2024-01-01 PROCEDURE — 87040 BLOOD CULTURE FOR BACTERIA: CPT

## 2024-01-01 PROCEDURE — 82330 ASSAY OF CALCIUM: CPT

## 2024-01-01 PROCEDURE — 99291 CRITICAL CARE FIRST HOUR: CPT | Mod: 25,GC

## 2024-01-01 PROCEDURE — 86965 POOLING BLOOD PLATELETS: CPT

## 2024-01-01 PROCEDURE — 85014 HEMATOCRIT: CPT

## 2024-01-01 PROCEDURE — 84443 ASSAY THYROID STIM HORMONE: CPT

## 2024-01-01 PROCEDURE — 99292 CRITICAL CARE ADDL 30 MIN: CPT

## 2024-01-01 PROCEDURE — 71045 X-RAY EXAM CHEST 1 VIEW: CPT

## 2024-01-01 PROCEDURE — 85027 COMPLETE CBC AUTOMATED: CPT

## 2024-01-01 PROCEDURE — 84295 ASSAY OF SERUM SODIUM: CPT

## 2024-01-01 PROCEDURE — 36556 INSERT NON-TUNNEL CV CATH: CPT

## 2024-01-01 PROCEDURE — 36620 INSERTION CATHETER ARTERY: CPT

## 2024-01-01 PROCEDURE — 85730 THROMBOPLASTIN TIME PARTIAL: CPT

## 2024-01-01 PROCEDURE — 87640 STAPH A DNA AMP PROBE: CPT

## 2024-01-01 PROCEDURE — 36415 COLL VENOUS BLD VENIPUNCTURE: CPT

## 2024-01-01 PROCEDURE — 86900 BLOOD TYPING SEROLOGIC ABO: CPT

## 2024-01-01 PROCEDURE — 94003 VENT MGMT INPAT SUBQ DAY: CPT

## 2024-01-01 PROCEDURE — 82803 BLOOD GASES ANY COMBINATION: CPT

## 2024-01-01 PROCEDURE — 80061 LIPID PANEL: CPT

## 2024-01-01 PROCEDURE — 83935 ASSAY OF URINE OSMOLALITY: CPT

## 2024-01-01 PROCEDURE — 84132 ASSAY OF SERUM POTASSIUM: CPT

## 2024-01-01 PROCEDURE — 70498 CT ANGIOGRAPHY NECK: CPT | Mod: 26

## 2024-01-01 PROCEDURE — 86850 RBC ANTIBODY SCREEN: CPT

## 2024-01-01 PROCEDURE — 85018 HEMOGLOBIN: CPT

## 2024-01-01 PROCEDURE — 83605 ASSAY OF LACTIC ACID: CPT

## 2024-01-01 PROCEDURE — 84484 ASSAY OF TROPONIN QUANT: CPT

## 2024-01-01 PROCEDURE — 71045 X-RAY EXAM CHEST 1 VIEW: CPT | Mod: 26

## 2024-01-01 PROCEDURE — 93005 ELECTROCARDIOGRAM TRACING: CPT

## 2024-01-01 PROCEDURE — 85520 HEPARIN ASSAY: CPT

## 2024-01-01 PROCEDURE — 99291 CRITICAL CARE FIRST HOUR: CPT

## 2024-01-01 PROCEDURE — 84100 ASSAY OF PHOSPHORUS: CPT

## 2024-01-01 PROCEDURE — 83735 ASSAY OF MAGNESIUM: CPT

## 2024-01-01 PROCEDURE — 85384 FIBRINOGEN ACTIVITY: CPT

## 2024-01-01 PROCEDURE — 70498 CT ANGIOGRAPHY NECK: CPT | Mod: MC

## 2024-01-01 PROCEDURE — 83036 HEMOGLOBIN GLYCOSYLATED A1C: CPT

## 2024-01-01 PROCEDURE — 70450 CT HEAD/BRAIN W/O DYE: CPT | Mod: MC

## 2024-01-01 PROCEDURE — 82435 ASSAY OF BLOOD CHLORIDE: CPT

## 2024-01-01 PROCEDURE — 36430 TRANSFUSION BLD/BLD COMPNT: CPT

## 2024-01-01 PROCEDURE — 70496 CT ANGIOGRAPHY HEAD: CPT | Mod: MC

## 2024-01-01 PROCEDURE — 85610 PROTHROMBIN TIME: CPT

## 2024-01-01 PROCEDURE — 93010 ELECTROCARDIOGRAM REPORT: CPT

## 2024-01-01 PROCEDURE — 93306 TTE W/DOPPLER COMPLETE: CPT | Mod: 26

## 2024-01-01 PROCEDURE — 99292 CRITICAL CARE ADDL 30 MIN: CPT | Mod: 25

## 2024-01-01 PROCEDURE — 82570 ASSAY OF URINE CREATININE: CPT

## 2024-01-01 PROCEDURE — P9012: CPT

## 2024-01-01 PROCEDURE — 70450 CT HEAD/BRAIN W/O DYE: CPT | Mod: 26,77,59

## 2024-01-01 PROCEDURE — 80053 COMPREHEN METABOLIC PANEL: CPT

## 2024-01-01 PROCEDURE — 84145 PROCALCITONIN (PCT): CPT

## 2024-01-01 PROCEDURE — 85025 COMPLETE CBC W/AUTO DIFF WBC: CPT

## 2024-01-01 PROCEDURE — C9399: CPT

## 2024-01-01 PROCEDURE — 86901 BLOOD TYPING SEROLOGIC RH(D): CPT

## 2024-01-01 PROCEDURE — C8929: CPT

## 2024-01-01 PROCEDURE — 84300 ASSAY OF URINE SODIUM: CPT

## 2024-01-01 PROCEDURE — 70496 CT ANGIOGRAPHY HEAD: CPT | Mod: 26

## 2024-01-01 PROCEDURE — 82947 ASSAY GLUCOSE BLOOD QUANT: CPT

## 2024-01-01 RX ORDER — VASOPRESSIN 20 [USP'U]/ML
0.04 INJECTION INTRAVENOUS
Qty: 40 | Refills: 0 | Status: DISCONTINUED | OUTPATIENT
Start: 2024-01-01 | End: 2024-01-01

## 2024-01-01 RX ORDER — MANNITOL
200 POWDER (GRAM) MISCELLANEOUS
Qty: 100 | Refills: 0 | Status: DISCONTINUED | OUTPATIENT
Start: 2024-01-01 | End: 2024-01-01

## 2024-01-01 RX ORDER — MIDAZOLAM HYDROCHLORIDE 1 MG/ML
2 INJECTION, SOLUTION INTRAMUSCULAR; INTRAVENOUS ONCE
Refills: 0 | Status: DISCONTINUED | OUTPATIENT
Start: 2024-01-01 | End: 2024-01-01

## 2024-01-01 RX ORDER — LEVETIRACETAM 250 MG/1
1000 TABLET, FILM COATED ORAL ONCE
Refills: 0 | Status: COMPLETED | OUTPATIENT
Start: 2024-01-01 | End: 2024-01-01

## 2024-01-01 RX ORDER — SUGAMMADEX 100 MG/ML
225 INJECTION, SOLUTION INTRAVENOUS ONCE
Refills: 0 | Status: COMPLETED | OUTPATIENT
Start: 2024-01-01 | End: 2024-01-01

## 2024-01-01 RX ORDER — DEXMEDETOMIDINE HYDROCHLORIDE IN 0.9% SODIUM CHLORIDE 4 UG/ML
1 INJECTION INTRAVENOUS
Qty: 200 | Refills: 0 | Status: DISCONTINUED | OUTPATIENT
Start: 2024-01-01 | End: 2024-01-01

## 2024-01-01 RX ORDER — NOREPINEPHRINE BITARTRATE/D5W 8 MG/250ML
0.05 PLASTIC BAG, INJECTION (ML) INTRAVENOUS
Qty: 8 | Refills: 0 | Status: DISCONTINUED | OUTPATIENT
Start: 2024-01-01 | End: 2024-01-01

## 2024-01-01 RX ORDER — PANTOPRAZOLE SODIUM 20 MG/1
40 TABLET, DELAYED RELEASE ORAL DAILY
Refills: 0 | Status: DISCONTINUED | OUTPATIENT
Start: 2024-01-01 | End: 2024-01-01

## 2024-01-01 RX ORDER — POTASSIUM CHLORIDE 20 MEQ
40 PACKET (EA) ORAL ONCE
Refills: 0 | Status: DISCONTINUED | OUTPATIENT
Start: 2024-01-01 | End: 2024-01-01

## 2024-01-01 RX ORDER — POTASSIUM PHOSPHATE, MONOBASIC POTASSIUM PHOSPHATE, DIBASIC 236; 224 MG/ML; MG/ML
15 INJECTION, SOLUTION INTRAVENOUS ONCE
Refills: 0 | Status: COMPLETED | OUTPATIENT
Start: 2024-01-01 | End: 2024-01-01

## 2024-01-01 RX ORDER — SODIUM CHLORIDE 9 MG/ML
1000 INJECTION, SOLUTION INTRAVENOUS
Refills: 0 | Status: DISCONTINUED | OUTPATIENT
Start: 2024-01-01 | End: 2024-01-01

## 2024-01-01 RX ORDER — POTASSIUM CHLORIDE 20 MEQ
20 PACKET (EA) ORAL EVERY 4 HOURS
Refills: 0 | Status: COMPLETED | OUTPATIENT
Start: 2024-01-01 | End: 2024-01-01

## 2024-01-01 RX ORDER — POTASSIUM CHLORIDE 20 MEQ
40 PACKET (EA) ORAL ONCE
Refills: 0 | Status: COMPLETED | OUTPATIENT
Start: 2024-01-01 | End: 2024-01-01

## 2024-01-01 RX ORDER — NICARDIPINE HYDROCHLORIDE 30 MG/1
2.5 CAPSULE, EXTENDED RELEASE ORAL
Qty: 40 | Refills: 0 | Status: DISCONTINUED | OUTPATIENT
Start: 2024-01-01 | End: 2024-01-01

## 2024-01-01 RX ORDER — MIDAZOLAM HYDROCHLORIDE 1 MG/ML
0.02 INJECTION, SOLUTION INTRAMUSCULAR; INTRAVENOUS
Qty: 100 | Refills: 0 | Status: DISCONTINUED | OUTPATIENT
Start: 2024-01-01 | End: 2024-01-01

## 2024-01-01 RX ORDER — SODIUM CHLORIDE 5 G/100ML
1000 INJECTION, SOLUTION INTRAVENOUS
Refills: 0 | Status: DISCONTINUED | OUTPATIENT
Start: 2024-01-01 | End: 2024-01-01

## 2024-01-01 RX ORDER — CANDESARTAN CILEXETIL 8 MG/1
1 TABLET ORAL
Refills: 0 | DISCHARGE

## 2024-01-01 RX ORDER — LEVETIRACETAM 250 MG/1
500 TABLET, FILM COATED ORAL
Refills: 0 | Status: DISCONTINUED | OUTPATIENT
Start: 2024-01-01 | End: 2024-01-01

## 2024-01-01 RX ORDER — FENTANYL CITRATE 50 UG/ML
0.5 INJECTION INTRAVENOUS
Qty: 5000 | Refills: 0 | Status: DISCONTINUED | OUTPATIENT
Start: 2024-01-01 | End: 2024-01-01

## 2024-01-01 RX ORDER — PIPERACILLIN AND TAZOBACTAM 4; .5 G/20ML; G/20ML
3.38 INJECTION, POWDER, LYOPHILIZED, FOR SOLUTION INTRAVENOUS EVERY 8 HOURS
Refills: 0 | Status: DISCONTINUED | OUTPATIENT
Start: 2024-01-01 | End: 2024-01-01

## 2024-01-01 RX ORDER — FENTANYL CITRATE 50 UG/ML
50 INJECTION INTRAVENOUS ONCE
Refills: 0 | Status: DISCONTINUED | OUTPATIENT
Start: 2024-01-01 | End: 2024-01-01

## 2024-01-01 RX ORDER — HYDROMORPHONE HYDROCHLORIDE 2 MG/ML
1 INJECTION INTRAMUSCULAR; INTRAVENOUS; SUBCUTANEOUS
Refills: 0 | Status: DISCONTINUED | OUTPATIENT
Start: 2024-01-01 | End: 2024-01-01

## 2024-01-01 RX ORDER — PIPERACILLIN AND TAZOBACTAM 4; .5 G/20ML; G/20ML
3.38 INJECTION, POWDER, LYOPHILIZED, FOR SOLUTION INTRAVENOUS ONCE
Refills: 0 | Status: COMPLETED | OUTPATIENT
Start: 2024-01-01 | End: 2024-01-01

## 2024-01-01 RX ORDER — FENTANYL CITRATE 50 UG/ML
0.5 INJECTION INTRAVENOUS
Qty: 2500 | Refills: 0 | Status: DISCONTINUED | OUTPATIENT
Start: 2024-01-01 | End: 2024-01-01

## 2024-01-01 RX ORDER — POTASSIUM PHOSPHATE, MONOBASIC POTASSIUM PHOSPHATE, DIBASIC 236; 224 MG/ML; MG/ML
30 INJECTION, SOLUTION INTRAVENOUS ONCE
Refills: 0 | Status: COMPLETED | OUTPATIENT
Start: 2024-01-01 | End: 2024-01-01

## 2024-01-01 RX ORDER — ROBINUL 0.2 MG/ML
0.4 INJECTION INTRAMUSCULAR; INTRAVENOUS
Refills: 0 | Status: DISCONTINUED | OUTPATIENT
Start: 2024-01-01 | End: 2024-01-01

## 2024-01-01 RX ORDER — MANNITOL
100 POWDER (GRAM) MISCELLANEOUS ONCE
Refills: 0 | Status: COMPLETED | OUTPATIENT
Start: 2024-01-01 | End: 2024-01-01

## 2024-01-01 RX ORDER — CHLORHEXIDINE GLUCONATE 213 G/1000ML
1 SOLUTION TOPICAL
Refills: 0 | Status: DISCONTINUED | OUTPATIENT
Start: 2024-01-01 | End: 2024-01-01

## 2024-01-01 RX ORDER — SODIUM CHLORIDE 9 MG/ML
10 INJECTION INTRAMUSCULAR; INTRAVENOUS; SUBCUTANEOUS
Refills: 0 | Status: DISCONTINUED | OUTPATIENT
Start: 2024-01-01 | End: 2024-01-01

## 2024-01-01 RX ORDER — HEPARIN SODIUM 5000 [USP'U]/ML
2400 INJECTION INTRAVENOUS; SUBCUTANEOUS
Qty: 25000 | Refills: 0 | Status: DISCONTINUED | OUTPATIENT
Start: 2024-01-01 | End: 2024-01-01

## 2024-01-01 RX ORDER — PROPOFOL 10 MG/ML
10 INJECTION, EMULSION INTRAVENOUS
Qty: 500 | Refills: 0 | Status: DISCONTINUED | OUTPATIENT
Start: 2024-01-01 | End: 2024-01-01

## 2024-01-01 RX ORDER — MAGNESIUM SULFATE 500 MG/ML
2 VIAL (ML) INJECTION ONCE
Refills: 0 | Status: COMPLETED | OUTPATIENT
Start: 2024-01-01 | End: 2024-01-01

## 2024-01-01 RX ORDER — POTASSIUM CHLORIDE 20 MEQ
20 PACKET (EA) ORAL ONCE
Refills: 0 | Status: COMPLETED | OUTPATIENT
Start: 2024-01-01 | End: 2024-01-01

## 2024-01-01 RX ORDER — POTASSIUM CHLORIDE 20 MEQ
10 PACKET (EA) ORAL ONCE
Refills: 0 | Status: COMPLETED | OUTPATIENT
Start: 2024-01-01 | End: 2024-01-01

## 2024-01-01 RX ORDER — ONDANSETRON 8 MG/1
4 TABLET, FILM COATED ORAL ONCE
Refills: 0 | Status: COMPLETED | OUTPATIENT
Start: 2024-01-01 | End: 2024-01-01

## 2024-01-01 RX ORDER — HEPARIN SODIUM 5000 [USP'U]/ML
INJECTION INTRAVENOUS; SUBCUTANEOUS
Qty: 25000 | Refills: 0 | Status: DISCONTINUED | OUTPATIENT
Start: 2024-01-01 | End: 2024-01-01

## 2024-01-01 RX ORDER — CHLORHEXIDINE GLUCONATE 213 G/1000ML
1 SOLUTION TOPICAL DAILY
Refills: 0 | Status: DISCONTINUED | OUTPATIENT
Start: 2024-01-01 | End: 2024-01-01

## 2024-01-01 RX ORDER — VANCOMYCIN HCL 1 G
1250 VIAL (EA) INTRAVENOUS ONCE
Refills: 0 | Status: COMPLETED | OUTPATIENT
Start: 2024-01-01 | End: 2024-01-01

## 2024-01-01 RX ORDER — CHLORHEXIDINE GLUCONATE 213 G/1000ML
15 SOLUTION TOPICAL EVERY 12 HOURS
Refills: 0 | Status: DISCONTINUED | OUTPATIENT
Start: 2024-01-01 | End: 2024-01-01

## 2024-01-01 RX ORDER — PROTAMINE SULFATE 10 MG/ML
50 AMPUL (ML) INTRAVENOUS ONCE
Refills: 0 | Status: COMPLETED | OUTPATIENT
Start: 2024-01-01 | End: 2024-01-01

## 2024-01-01 RX ADMIN — CHLORHEXIDINE GLUCONATE 1 APPLICATION(S): 213 SOLUTION TOPICAL at 05:20

## 2024-01-01 RX ADMIN — Medication 10.6 MICROGRAM(S)/KG/MIN: at 12:07

## 2024-01-01 RX ADMIN — PIPERACILLIN AND TAZOBACTAM 25 GRAM(S): 4; .5 INJECTION, POWDER, LYOPHILIZED, FOR SOLUTION INTRAVENOUS at 05:13

## 2024-01-01 RX ADMIN — Medication 20 MILLIEQUIVALENT(S): at 16:53

## 2024-01-01 RX ADMIN — LEVETIRACETAM 400 MILLIGRAM(S): 250 TABLET, FILM COATED ORAL at 17:53

## 2024-01-01 RX ADMIN — PANTOPRAZOLE SODIUM 40 MILLIGRAM(S): 20 TABLET, DELAYED RELEASE ORAL at 15:31

## 2024-01-01 RX ADMIN — SODIUM CHLORIDE 125 MILLILITER(S): 9 INJECTION, SOLUTION INTRAVENOUS at 19:07

## 2024-01-01 RX ADMIN — SODIUM CHLORIDE 200 MILLILITER(S): 9 INJECTION, SOLUTION INTRAVENOUS at 20:16

## 2024-01-01 RX ADMIN — SODIUM CHLORIDE 150 MILLILITER(S): 9 INJECTION, SOLUTION INTRAVENOUS at 00:58

## 2024-01-01 RX ADMIN — VASOPRESSIN 6 UNIT(S)/MIN: 20 INJECTION INTRAVENOUS at 15:31

## 2024-01-01 RX ADMIN — SUGAMMADEX 225 MILLIGRAM(S): 100 INJECTION, SOLUTION INTRAVENOUS at 17:42

## 2024-01-01 RX ADMIN — CHLORHEXIDINE GLUCONATE 1 APPLICATION(S): 213 SOLUTION TOPICAL at 05:13

## 2024-01-01 RX ADMIN — SODIUM CHLORIDE 125 MILLILITER(S): 9 INJECTION, SOLUTION INTRAVENOUS at 13:31

## 2024-01-01 RX ADMIN — Medication 10.6 MICROGRAM(S)/KG/MIN: at 05:13

## 2024-01-01 RX ADMIN — SODIUM CHLORIDE 100 MILLILITER(S): 9 INJECTION, SOLUTION INTRAVENOUS at 10:26

## 2024-01-01 RX ADMIN — SODIUM CHLORIDE 100 MILLILITER(S): 9 INJECTION, SOLUTION INTRAVENOUS at 05:19

## 2024-01-01 RX ADMIN — Medication 10.6 MICROGRAM(S)/KG/MIN: at 19:07

## 2024-01-01 RX ADMIN — HYDROMORPHONE HYDROCHLORIDE 1 MILLIGRAM(S): 2 INJECTION INTRAMUSCULAR; INTRAVENOUS; SUBCUTANEOUS at 16:18

## 2024-01-01 RX ADMIN — Medication 10.6 MICROGRAM(S)/KG/MIN: at 15:32

## 2024-01-01 RX ADMIN — CHLORHEXIDINE GLUCONATE 15 MILLILITER(S): 213 SOLUTION TOPICAL at 17:07

## 2024-01-01 RX ADMIN — PANTOPRAZOLE SODIUM 40 MILLIGRAM(S): 20 TABLET, DELAYED RELEASE ORAL at 12:06

## 2024-01-01 RX ADMIN — NICARDIPINE HYDROCHLORIDE 12.5 MG/HR: 30 CAPSULE, EXTENDED RELEASE ORAL at 14:33

## 2024-01-01 RX ADMIN — POTASSIUM PHOSPHATE, MONOBASIC POTASSIUM PHOSPHATE, DIBASIC 62.5 MILLIMOLE(S): 236; 224 INJECTION, SOLUTION INTRAVENOUS at 12:06

## 2024-01-01 RX ADMIN — Medication 100 MILLIEQUIVALENT(S): at 10:22

## 2024-01-01 RX ADMIN — LEVETIRACETAM 400 MILLIGRAM(S): 250 TABLET, FILM COATED ORAL at 05:12

## 2024-01-01 RX ADMIN — Medication 166.67 MILLIGRAM(S): at 06:01

## 2024-01-01 RX ADMIN — PIPERACILLIN AND TAZOBACTAM 25 GRAM(S): 4; .5 INJECTION, POWDER, LYOPHILIZED, FOR SOLUTION INTRAVENOUS at 15:31

## 2024-01-01 RX ADMIN — Medication 10.6 MICROGRAM(S)/KG/MIN: at 18:39

## 2024-01-01 RX ADMIN — LEVETIRACETAM 400 MILLIGRAM(S): 250 TABLET, FILM COATED ORAL at 05:18

## 2024-01-01 RX ADMIN — CHLORHEXIDINE GLUCONATE 15 MILLILITER(S): 213 SOLUTION TOPICAL at 05:18

## 2024-01-01 RX ADMIN — CHLORHEXIDINE GLUCONATE 15 MILLILITER(S): 213 SOLUTION TOPICAL at 05:42

## 2024-01-01 RX ADMIN — Medication 40 MILLIEQUIVALENT(S): at 19:14

## 2024-01-01 RX ADMIN — VASOPRESSIN 6 UNIT(S)/MIN: 20 INJECTION INTRAVENOUS at 10:26

## 2024-01-01 RX ADMIN — POTASSIUM PHOSPHATE, MONOBASIC POTASSIUM PHOSPHATE, DIBASIC 83.33 MILLIMOLE(S): 236; 224 INJECTION, SOLUTION INTRAVENOUS at 06:01

## 2024-01-01 RX ADMIN — ONDANSETRON 4 MILLIGRAM(S): 8 TABLET, FILM COATED ORAL at 14:33

## 2024-01-01 RX ADMIN — VASOPRESSIN 6 UNIT(S)/MIN: 20 INJECTION INTRAVENOUS at 12:07

## 2024-01-01 RX ADMIN — PIPERACILLIN AND TAZOBACTAM 25 GRAM(S): 4; .5 INJECTION, POWDER, LYOPHILIZED, FOR SOLUTION INTRAVENOUS at 21:03

## 2024-01-01 RX ADMIN — Medication 25 GRAM(S): at 15:45

## 2024-01-01 RX ADMIN — PIPERACILLIN AND TAZOBACTAM 25 GRAM(S): 4; .5 INJECTION, POWDER, LYOPHILIZED, FOR SOLUTION INTRAVENOUS at 10:25

## 2024-01-01 RX ADMIN — Medication 1000 GRAM(S): at 17:42

## 2024-01-01 RX ADMIN — Medication 100 MILLIEQUIVALENT(S): at 05:44

## 2024-01-01 RX ADMIN — VASOPRESSIN 6 UNIT(S)/MIN: 20 INJECTION INTRAVENOUS at 05:19

## 2024-01-01 RX ADMIN — PIPERACILLIN AND TAZOBACTAM 200 GRAM(S): 4; .5 INJECTION, POWDER, LYOPHILIZED, FOR SOLUTION INTRAVENOUS at 05:18

## 2024-01-01 RX ADMIN — VASOPRESSIN 6 UNIT(S)/MIN: 20 INJECTION INTRAVENOUS at 19:07

## 2024-01-01 RX ADMIN — VASOPRESSIN 6 UNIT(S)/MIN: 20 INJECTION INTRAVENOUS at 05:13

## 2024-01-01 RX ADMIN — Medication 10.6 MICROGRAM(S)/KG/MIN: at 10:26

## 2024-01-01 RX ADMIN — Medication 40 MILLIEQUIVALENT(S): at 00:57

## 2024-01-01 RX ADMIN — SODIUM CHLORIDE 60 MILLILITER(S): 5 INJECTION, SOLUTION INTRAVENOUS at 17:43

## 2024-01-01 RX ADMIN — Medication 100 MILLIEQUIVALENT(S): at 15:45

## 2024-01-01 RX ADMIN — Medication 0.5 MILLIGRAM(S): at 16:18

## 2024-01-01 RX ADMIN — Medication 330 MILLIGRAM(S): at 17:42

## 2024-01-01 RX ADMIN — Medication 10.6 MICROGRAM(S)/KG/MIN: at 05:18

## 2024-01-01 RX ADMIN — LEVETIRACETAM 400 MILLIGRAM(S): 250 TABLET, FILM COATED ORAL at 19:49

## 2024-01-01 RX ADMIN — SODIUM CHLORIDE 150 MILLILITER(S): 9 INJECTION, SOLUTION INTRAVENOUS at 05:14

## 2024-04-12 NOTE — ASU PREOP CHECKLIST - HEIGHT IN INCHES
Internal Medicine History & Physical      Chief Complaint   Patient presents with    High Blood Sugar Symptomatic    Dizziness       History Of Present Illness  Gina is a 52 year old female with past medical history of non-insulin-dependent type 2 diabetes mellitus on metformin and glimepiride presented to emergency department complaints of dizziness last 2 weeks.  I have seen and examined patient on medical floor, encounter was initiated with  #[id number].    Family is at bedside.  Patient states that she has been feeling dizzy last 2 weeks mostly during the day when she was moving around.  Patient states that she thinks dizziness was associated with her high blood sugar or high cholesterol.  Denies any headache, changes with her vision, no numbness or tingling in all extremities.  Denies chest pain palpitations or diaphoresis with dizziness.  No previous history of MI, stroke, DVT/PE or GI bleed.  Patient recently started metformin just took 1 tablet, they changed to her medication since previous medication caused diarrhea, patient could not remember the name of the pill but states it was white.    On admission vital signs significant to P87, /72. Labs significant to glucose 147, . Troponin x 2 negative.  UA positive to moderate leukocyte esterase, RBC 3-5 and WBC 11-25.  Hydroxybutyrate 0.4.  CT of the head without acute intracranial findings.  Chest x-ray unremarkable.  CT abdominal pelvis with contrast showed no acute intra-abdominal or intrapelvic process.  Noted cholelithiasis.  Noted prominence of the endometrial cavity given postmenopausal status.    In ED given Keflex, normal saline 1 L bolus and patient was admitted for further evaluation monitoring and management.          Review of Systems  Review of Systems   Constitutional:  Negative for chills, fatigue and fever.   HENT:  Negative for congestion and rhinorrhea.    Respiratory:  Negative for cough, shortness of breath and  wheezing.    Cardiovascular:  Negative for chest pain, palpitations and leg swelling.   Gastrointestinal:  Positive for constipation and diarrhea. Negative for abdominal pain.   Genitourinary:  Negative for difficulty urinating and dysuria.   Musculoskeletal:  Negative for arthralgias and back pain.   Skin:  Negative for color change and wound.   Neurological:  Positive for dizziness and light-headedness. Negative for syncope, speech difficulty, weakness and numbness.   Psychiatric/Behavioral:  Negative for behavioral problems. The patient is not nervous/anxious.         Past Medical History  Past Medical History:   Diagnosis Date    Diabetes mellitus (CMD)         Surgical History  Patient denies any previous surgeries     Social History  Social History     Tobacco Use    Smoking status: Never    Smokeless tobacco: Never   Vaping Use    Vaping status: never used   Substance Use Topics    Alcohol use: Never    Drug use: Never        Family History  Family History   Problem Relation Age of Onset    Patient is unaware of any medical problems Mother     Diabetes Father         Allergies  ALLERGIES:  Patient has no known allergies.    Medications  Medications Prior to Admission   Medication Sig Dispense Refill    glimepiride (AMARYL) 4 MG tablet Take 4 mg by mouth daily.      metFORMIN (GLUCOPHAGE-XR) 500 MG 24 hr tablet Take 1 tablet by mouth 2 times daily (with meals). 180 tablet 1       Current Facility-Administered Medications   Medication Dose Route Frequency Provider Last Rate Last Admin    sodium chloride 0.9 % flush bag 25 mL  25 mL Intravenous PRN Elan Whyte MD        sodium chloride 0.9 % injection 2 mL  2 mL Intracatheter 2 times per day Elan Whyte MD        heparin (porcine) injection 5,000 Units  5,000 Units Subcutaneous 3 times per day Elan Whyte MD        acetaminophen (TYLENOL) tablet 650 mg  650 mg Oral Q4H PRN Elan Whyte MD        Or    acetaminophen (TYLENOL) suppository 650 mg   650 mg Rectal Q4H PRN Elan Whyte MD        ondansetron (ZOFRAN ODT) disintegrating tablet 4 mg  4 mg Oral Q12H PRN Elan Whyte MD        Or    ondansetron (ZOFRAN) injection 4 mg  4 mg Intravenous Q12H PRN Elan Whyte MD        polyethylene glycol (MIRALAX) packet 17 g  17 g Oral Daily PRN Elan Whyte MD        docusate sodium-sennosides (SENOKOT S) 50-8.6 MG 2 tablet  2 tablet Oral BID PRN Elan Whyte MD        bisacodyl (DULCOLAX) suppository 10 mg  10 mg Rectal Daily PRN Elan Whyte MD        magnesium hydroxide (MILK OF MAGNESIA) 400 MG/5ML suspension 30 mL  30 mL Oral Daily PRN Elan Whyte MD        melatonin tablet 3 mg  3 mg Oral Nightly PRN Elan Whyte MD        Potassium Standard Replacement Protocol (Levels 3.5 and lower)   Does not apply See Admin Instructions Elan Whyte MD        Magnesium Standard Replacement Protocol   Does not apply See Admin Instructions Elan Whyte MD        pantoprazole (PROTONIX) EC tablet 40 mg  40 mg Oral QAM AC Elan Whyte MD        cefTRIAXone (ROCEPHIN) syringe 1,000 mg  1,000 mg Intravenous Daily Elan Whyte MD        sodium chloride (NORMAL SALINE) 0.9 % bolus 500 mL  500 mL Intravenous PRN Elan Whyte MD        dextrose 50 % injection 25 g  25 g Intravenous PRN Elan Whyte MD        dextrose 50 % injection 12.5 g  12.5 g Intravenous PRN Elan Whyte MD        glucagon (GLUCAGEN) injection 1 mg  1 mg Intramuscular PRN Elan Whyte MD        dextrose (GLUTOSE) 40 % gel 15 g  15 g Oral PRN Elan Whyte MD        dextrose (GLUTOSE) 40 % gel 30 g  30 g Oral PRN Elan Whyte MD        insulin lispro (ADMELOG,HumaLOG) - Correction Dose   Subcutaneous TID WC Elan Whyte MD             Last Recorded Vitals  Vitals with min/max:      Vital Last Value 24 Hour Range   Temperature 98.1 °F (36.7 °C) (04/12/24 0120) Temp  Min: 98.1 °F (36.7 °C)  Max: 98.2 °F (36.8 °C)   Pulse 81 (04/12/24 0120) Pulse  Min: 79  Max:  87   Respiratory 16 (04/12/24 0120) Resp  Min: 14  Max: 20   Non-Invasive  Blood Pressure 138/68 (04/12/24 0120) BP  Min: 137/64  Max: 158/67   Pulse Oximetry 97 % (04/12/24 0120) SpO2  Min: 95 %  Max: 100 %   Arterial   Blood Pressure   No data recorded      I/O last 3 completed shifts:  In: 1000 [IV Piggyback:1000]  Out: -   No intake/output data recorded.    Physical Exam  Constitutional:       Appearance: She is obese.   HENT:      Head: Normocephalic and atraumatic.      Nose: Nose normal.      Mouth/Throat:      Mouth: Mucous membranes are dry.      Neck: Normal range of motion and neck supple.   Eyes:      Extraocular Movements: Extraocular movements intact.      Conjunctiva/sclera: Conjunctivae normal.      Pupils: Pupils are equal, round, and reactive to light.   Cardiovascular:      Rate and Rhythm: Normal rate and regular rhythm.      Pulses: Normal pulses.      Heart sounds: Normal heart sounds.   Pulmonary:      Effort: Pulmonary effort is normal.      Breath sounds: Normal breath sounds.   Abdominal:      General: Bowel sounds are normal. There is no distension.      Palpations: Abdomen is soft.      Tenderness: There is no abdominal tenderness.   Musculoskeletal:         General: Normal range of motion.      Right lower leg: No edema.      Left lower leg: No edema.   Skin:     General: Skin is warm and dry.   Neurological:      General: No focal deficit present.      Mental Status: She is alert and oriented to person, place, and time.   Psychiatric:         Mood and Affect: Mood normal.         Behavior: Behavior normal.         Thought Content: Thought content normal.            Imaging  CT HEAD WO CONTRAST   Final Result      1.   No CT evidence of acute intracranial findings. Specifically, no acute   territorial infarct, intracranial hemorrhage or mass effect.   2.   Mild chronic intracranial findings as above.               Electronically Signed by: ANGELA TAMEZ MD    Signed on: 4/12/2024 12:06  AM    Workstation ID: [id number]      CT ABDOMEN PELVIS W CONTRAST - IV contrast only   Final Result      1.   No acute intra-abdominal or intrapelvic process.   2.   Cholelithiasis.   3.   Noted prominence of the endometrial cavity given postmenopausal   status. Consider correlation with nonemergent outpatient pelvic ultrasound.      Electronically Signed by: OARL MALHOTRA MD    Signed on: 4/11/2024 10:36 PM    Workstation ID: [id number]      XR CHEST PA AND LATERAL 2 VIEWS   Final Result   1. No acute cardiopulmonary abnormality, radiographically.      Electronically Signed by: DAVIE GARCIA MD    Signed on: 4/11/2024 9:13 PM    Workstation ID: [id number]           Labs     Recent Results (from the past 48 hour(s))   GLUCOSE, BEDSIDE - POINT OF CARE    Collection Time: 04/11/24  8:12 PM   Result Value Ref Range    GLUCOSE, BEDSIDE - POINT OF CARE 193 (H) 70 - 99 mg/dL   Electrocardiogram 12-Lead    Collection Time: 04/11/24  8:18 PM   Result Value Ref Range    Ventricular Rate EKG/Min (BPM) 81     Atrial Rate (BPM) 81     ME-Interval (MSEC) 164     QRS-Interval (MSEC) 78     QT-Interval (MSEC) 388     QTc 450     P Axis (Degrees) 42     R Axis (Degrees) -9     T Axis (Degrees) 3     REPORT TEXT       Sinus rhythm  with  fusion complexes  Cannot rule out  Anterior infarct  , age undetermined  Abnormal ECG  No previous ECGs available     Comprehensive Metabolic Panel    Collection Time: 04/11/24  9:27 PM   Result Value Ref Range    Fasting Status      Sodium 140 135 - 145 mmol/L    Potassium 3.6 3.4 - 5.1 mmol/L    Chloride 101 97 - 110 mmol/L    Carbon Dioxide 29 21 - 32 mmol/L    Anion Gap 14 7 - 19 mmol/L    Glucose 147 (H) 70 - 99 mg/dL    BUN 10 6 - 20 mg/dL    Creatinine 0.51 0.51 - 0.95 mg/dL    Glomerular Filtration Rate >90 >=60    BUN/Cr 20 7 - 25    Calcium 9.4 8.4 - 10.2 mg/dL    Bilirubin, Total 1.0 0.2 - 1.0 mg/dL    GOT/AST 13 <=37 Units/L    GPT/ALT 28 <64 Units/L    Alkaline  Phosphatase 120 (H) 45 - 117 Units/L    Albumin 3.9 3.6 - 5.1 g/dL    Protein, Total 8.4 (H) 6.4 - 8.2 g/dL    Globulin 4.5 (H) 2.0 - 4.0 g/dL    A/G Ratio 0.9 (L) 1.0 - 2.4   TROPONIN I, HIGH SENSITIVITY    Collection Time: 04/11/24  9:27 PM   Result Value Ref Range    Troponin I, High Sensitivity 5 <52 ng/L   Lipase    Collection Time: 04/11/24  9:27 PM   Result Value Ref Range    Lipase 38 15 - 77 Units/L   Urinalysis & Reflex Microscopy With Culture If Indicated    Collection Time: 04/11/24  9:27 PM   Result Value Ref Range    COLOR, URINALYSIS Straw     APPEARANCE, URINALYSIS Clear     GLUCOSE, URINALYSIS Negative Negative mg/dL    BILIRUBIN, URINALYSIS Negative Negative    KETONES, URINALYSIS Negative Negative mg/dL    SPECIFIC GRAVITY, URINALYSIS 1.011 1.005 - 1.030    OCCULT BLOOD, URINALYSIS Trace (A) Negative    PH, URINALYSIS 7.0 5.0 - 7.0    PROTEIN, URINALYSIS Negative Negative mg/dL    UROBILINOGEN, URINALYSIS 2.0 (A) 0.2, 1.0 mg/dL    NITRITE, URINALYSIS Negative Negative    LEUKOCYTE ESTERASE, URINALYSIS Moderate (A) Negative    SQUAMOUS EPITHELIAL, URINALYSIS 6 to 10 (A) None Seen, 1 to 5 /hpf    ERYTHROCYTES, URINALYSIS 3 to 5 (A) None Seen, 1 to 2 /hpf    LEUKOCYTES, URINALYSIS 11 to 25 (A) None Seen, 1 to 5 /hpf    BACTERIA, URINALYSIS None Seen None Seen /hpf    HYALINE CASTS, URINALYSIS None Seen None Seen, 1 to 5 /lpf    MUCUS Present    CBC with Automated Differential (performable only)    Collection Time: 04/11/24  9:27 PM   Result Value Ref Range    WBC 9.4 4.2 - 11.0 K/mcL    RBC 5.06 4.00 - 5.20 mil/mcL    HGB 15.4 12.0 - 15.5 g/dL    HCT 45.6 36.0 - 46.5 %    MCV 90.1 78.0 - 100.0 fl    MCH 30.4 26.0 - 34.0 pg    MCHC 33.8 32.0 - 36.5 g/dL    RDW-CV 13.6 11.0 - 15.0 %    RDW-SD 44.5 39.0 - 50.0 fL     140 - 450 K/mcL    NRBC 0 <=0 /100 WBC    Neutrophil, Percent 69 %    Lymphocytes, Percent 25 %    Mono, Percent 5 %    Eosinophils, Percent 1 %    Basophils, Percent 0 %    Immature  Granulocytes 0 %    Absolute Neutrophils 6.4 1.8 - 7.7 K/mcL    Absolute Lymphocytes 2.3 1.0 - 4.0 K/mcL    Absolute Monocytes 0.5 0.3 - 0.9 K/mcL    Absolute Eosinophils  0.1 0.0 - 0.5 K/mcL    Absolute Basophils 0.0 0.0 - 0.3 K/mcL    Absolute Immature Granulocytes 0.0 0.0 - 0.2 K/mcL   Beta Hydroxybutyrate    Collection Time: 04/11/24  9:27 PM   Result Value Ref Range    Beta Hydroxybutyrate 0.4 (H) 0.0 - 0.3 mmol/L   Magnesium    Collection Time: 04/11/24  9:27 PM   Result Value Ref Range    Magnesium 2.0 1.7 - 2.4 mg/dL   ECG    Collection Time: 04/11/24 11:01 PM   Result Value Ref Range    Ventricular Rate EKG/Min (BPM) 76     Atrial Rate (BPM) 76     WA-Interval (MSEC) 180     QRS-Interval (MSEC) 76     QT-Interval (MSEC) 396     QTc 445     P Axis (Degrees) 46     R Axis (Degrees) -10     T Axis (Degrees) -2     REPORT TEXT       Sinus rhythm  with  fusion complexes  Cannot rule out  Anterior infarct  (cited on or before  11-APR-2024)  Abnormal ECG  When compared with ECG of  11-APR-2024 20:18,  No significant change was found     GLUCOSE, BEDSIDE - POINT OF CARE    Collection Time: 04/11/24 11:23 PM   Result Value Ref Range    GLUCOSE, BEDSIDE - POINT OF CARE 122 (H) 70 - 99 mg/dL   TROPONIN I, HIGH SENSITIVITY    Collection Time: 04/11/24 11:24 PM   Result Value Ref Range    Troponin I, High Sensitivity 9 <52 ng/L   GLUCOSE, BEDSIDE - POINT OF CARE    Collection Time: 04/12/24  1:15 AM   Result Value Ref Range    GLUCOSE, BEDSIDE - POINT OF CARE 119 (H) 70 - 99 mg/dL       Microbiology Results       None             Assessment and Plan  Dizziness, multifactorial  Hyperglycemia, secondary to uncontrolled type 2 diabetes  Hypertension  Hyperlipidemia  Morbid obesity BMI 41  Start IV fluids.  Obtain hemoglobin A1c and check lipid panel.  Check TSH.  Obtain orthostatic vitals.  Hold home p.o. diabetic medication and start insulin sliding scale with Accu-Cheks, goal of blood sugar 140-180.    Rule out  UTI  Microhematuria  Start IV Rocephin, follow-up urine cultures and adjust antibiotics per sensitivity.  Patient will need to repeat UA with PCP as outpatient due to detected microhematuria on hospital admission.      Prominence of the endometrial cavity given postmenopausal status  PT will require nonemergent outpatient pelvic ultrasound      DVT Prophylaxis  heparin (porcine) - 5000 UNIT/ML     GI prophylaxis  PPI    Code Status    Code Status: Full Resuscitation  Advanced care planning has discussed with the patient.  - Spent 16 minutes with patient discussing the code status. Went over in the event of an emergency whether she would want chest compressions, intubation, cardioversion, antiarrhythmics, pressors. She indicates that they would like to be Full code at this time.      Primary Care Physician  Lisa Thomas, KAILEY Whyte MD     4/12/2024 2:10 AM       2

## 2024-05-19 PROBLEM — I10 ESSENTIAL (PRIMARY) HYPERTENSION: Chronic | Status: ACTIVE | Noted: 2020-11-02

## 2024-05-19 PROBLEM — E66.9 OBESITY, UNSPECIFIED: Chronic | Status: ACTIVE | Noted: 2020-11-02

## 2024-05-19 NOTE — PROCEDURE NOTE - NSINDICATIONS_GEN_A_CORE
critical illness/emergency venous access/hemodynamic monitoring
arterial puncture to obtain ABG's/critical patient

## 2024-05-19 NOTE — CONSULT NOTE ADULT - SUBJECTIVE AND OBJECTIVE BOX
p (1480)         --Anticoagulation:  protamine  IVPB 50 milliGRAM(s) IV Intermittent once    =====================  PAST MEDICAL HISTORY   HTN (hypertension)    Obesity      PAST SURGICAL HISTORY   No significant past surgical history    H/O hernia repair      No Known Allergies      MEDICATIONS:  Antibiotics:    Neuro:  propofol Infusion 10 MICROgram(s)/kG/Min IV Continuous <Continuous>  sugammadex Injectable 225 milliGRAM(s) IV Push once    Other:  mannitol 20% IVPB 100 Gram(s) IV Intermittent once  norepinephrine Infusion 0.05 MICROgram(s)/kG/Min IV Continuous <Continuous>  sodium chloride 0.9% lock flush 10 milliLiter(s) IV Push every 1 hour PRN  sodium chloride 2% . 1000 milliLiter(s) IV Continuous <Continuous>      SOCIAL HISTORY:   Occupation:   Marital Status:     FAMILY HISTORY:  FH: breast cancer (Mother)    FH: prostate cancer (Father)        ROS: Negative except per HPI    LABS:  PT/INR - ( 19 May 2024 14:14 )   PT: 13.8 sec;   INR: 1.26 ratio         PTT - ( 19 May 2024 14:14 )  PTT:102.0 sec                        12.7   12.10 )-----------( 126      ( 19 May 2024 14:14 )             38.3     05-19    140  |  108  |  10  ----------------------------<  163<H>  3.9   |  16<L>  |  1.00    Ca    8.6      19 May 2024 14:14  Phos  2.4     05-19  Mg     1.8     05-19    TPro  6.9  /  Alb  3.8  /  TBili  1.4<H>  /  DBili  x   /  AST  22  /  ALT  21  /  AlkPhos  79  05-19

## 2024-05-19 NOTE — PROCEDURE NOTE - NSICDXPROCEDURE_GEN_ALL_CORE_FT
PROCEDURES:  Percutaneous catheterization of artery 19-May-2024 14:10:59  Kallie Small  US guided vascular access 19-May-2024 14:11:21  Kallie Small

## 2024-05-19 NOTE — H&P ADULT - NSHPLABSRESULTS_GEN_ALL_CORE
12.7   12.10 )-----------( 126      ( 19 May 2024 14:14 )             38.3     05-19    140  |  108  |  10  ----------------------------<  163<H>  3.9   |  16<L>  |  1.00    Ca    8.6      19 May 2024 14:14  Phos  2.4     05-19  Mg     1.8     05-19    TPro  6.9  /  Alb  3.8  /  TBili  1.4<H>  /  DBili  x   /  AST  22  /  ALT  21  /  AlkPhos  79  05-19        ABG - ( 19 May 2024 15:42 )  pH, Arterial: 7.36  pH, Blood: x     /  pCO2: 24    /  pO2: 133   / HCO3: 14    / Base Excess: -9.9  /  SaO2: 99.8              LIVER FUNCTIONS - ( 19 May 2024 14:14 )  Alb: 3.8 g/dL / Pro: 6.9 g/dL / ALK PHOS: 79 U/L / ALT: 21 U/L / AST: 22 U/L / GGT: x           Urinalysis Basic - ( 19 May 2024 14:14 )    Color: x / Appearance: x / SG: x / pH: x  Gluc: 163 mg/dL / Ketone: x  / Bili: x / Urobili: x   Blood: x / Protein: x / Nitrite: x   Leuk Esterase: x / RBC: x / WBC x   Sq Epi: x / Non Sq Epi: x / Bacteria: x      PT/INR - ( 19 May 2024 14:14 )   PT: 13.8 sec;   INR: 1.26 ratio         PTT - ( 19 May 2024 14:14 )  PTT:102.0 sec
Stable.

## 2024-05-19 NOTE — H&P ADULT - HISTORY OF PRESENT ILLNESS
Patient is a 54 year old male with a past medical history of HTN who presented to [facility] with chest pain and sob starting this morning. Per spouse, pt woke up this morning with chest pain and sob. Pt had previously been healthy without recent complaints. Wife called 911, pt received aspirin and nitroglycerin and was brought to [facility]. In the ED, pt was hypoxic, hypotensive and vomiting. EKG and bedside echo were consistent with right heart strain. CT PE showed a saddle embolus with right heart strain. Pt was placed on BiPAP and heparin gtt was begun. Pt was given 90 mg of tPA. Pt  Patient is a 54 year old male with a past medical history of HTN who presented to [facility] with chest pain and sob starting this morning. Per spouse, pt woke up this morning with chest pain and sob. Pt had previously been healthy without recent complaints. No recent illnesses or traveling. Wife called 911, pt received aspirin and nitroglycerin and was brought to [facility]. In the ED, pt was hypoxic, hypotensive and vomiting. EKG and bedside echo were consistent with right heart strain. CT PE showed a saddle embolus with right heart strain. Pt was placed on BiPAP and heparin gtt was begun. Pt was given 90 mg of tPA. Pt had an episode of "eye rolling" and received IV ativan 2 mg x2 due to concern for seizure activity. Pt was subsequently transferred to [facility]. In the EMS, pt was agitated and received versed 4.5.     On presentation, pt was satting well on BiPAP with elevated blood pressures. Pt was transitioned to HF NC and A-line was placed. TTE showed RV strain without pulmonary HTN. ABG significant for pO2 64 and lactate 4.4. Patient is a 54 year old male with a past medical history of HTN who presented to [facility] with chest pain and sob starting this morning. Per spouse, pt woke up this morning with chest pain and sob. Pt had previously been healthy without recent complaints. No recent illnesses or traveling. Wife called 911, pt received aspirin and nitroglycerin and was brought to [facility]. In the ED, pt was hypoxic, hypotensive and vomiting. EKG and bedside echo were consistent with right heart strain. CT PE showed a saddle embolus with right heart strain. Pt was placed on BiPAP and heparin gtt was begun. Pt was given 90 mg of tPA. Pt had an episode of "eye rolling" and received IV ativan 2 mg x2 due to concern for seizure activity. Pt was subsequently transferred to [facility]. In the EMS, pt was agitated and received versed 4.5.     On presentation, pt was satting well on BiPAP with elevated blood pressures. Pt was transitioned to HF NC and A-line was placed. TTE showed RV strain. ABG significant for pO2 64 and lactate 4.4.

## 2024-05-19 NOTE — CHART NOTE - NSCHARTNOTEFT_GEN_A_CORE
Discussed repeat CTH and CTA head/neck findings with NSG resident Dr. Urias - diffuse brainstem edema and effacement of cisterns, findings consistent with herniation.  Physical exam consistent with findings, patient no longer with brainstem reflexes.  No neurosurgical intervention.  Will proceed with GOC with the family.  Organ donation called.  DC second cryo, no further blood products.  Dr. Myrick made aware.  Labs q6h per organ donation, monitor UOP, maintain Na < 150, keep SBP > 100 and MAP > 65 on Levophed.

## 2024-05-19 NOTE — CONSULT NOTE ADULT - SUBJECTIVE AND OBJECTIVE BOX
HPI:  55 yo man presented with saddle PE s/p tPA today and heparin drip, NSCU consulted for sudden onset worsening LOC, CT head showing BG IPH with IVH.       T(C): 36.5 (05-19-24 @ 15:30), Max: 36.5 (05-19-24 @ 15:30)  HR: 146 (05-19-24 @ 17:30) (86 - 146)  BP: 137/73 (05-19-24 @ 13:45) (137/73 - 146/93)  RR: 18 (05-19-24 @ 17:30) (8 - 44)  SpO2: 96% (05-19-24 @ 17:30) (80% - 100%)  05-19-24 @ 07:01  -  05-19-24 @ 17:28  --------------------------------------------------------  IN: 132.5 mL / OUT: 360 mL / NET: -227.5 mL    chlorhexidine 0.12% Liquid 15 milliLiter(s) Oral Mucosa every 12 hours  chlorhexidine 2% Cloths 1 Application(s) Topical daily  chlorhexidine 4% Liquid 1 Application(s) Topical <User Schedule>  mannitol 20% IVPB 100 Gram(s) IV Intermittent once  norepinephrine Infusion 0.05 MICROgram(s)/kG/Min IV Continuous <Continuous>  propofol Infusion 10 MICROgram(s)/kG/Min IV Continuous <Continuous>  protamine  IVPB 50 milliGRAM(s) IV Intermittent once  sodium chloride 0.9% lock flush 10 milliLiter(s) IV Push every 1 hour PRN  sodium chloride 2% . 1000 milliLiter(s) IV Continuous <Continuous>  sugammadex Injectable 225 milliGRAM(s) IV Push once  Mode: AC/ CMV (Assist Control/ Continuous Mandatory Ventilation), RR (machine): 14, TV (machine): 550, FiO2: 100, PEEP: 5, ITime: 9, MAP: 12, PIP: 23    exam:   he was just intubated and paralyzed  pupils 5 mm bilaterally not reactive, no corneals , no gag or cough 0/5 all over       IMPRESSION:    Acute right-sided intraparenchymal hemorrhage, centered at basal ganglia   with intraventricular extension. Midline shift, but more impressive   basilar cistern effacement from cerebral edema.    Right hemispheric edema and loss of cortical distinction suspicious for   MCA infarct. Follow-up is recommended. Density in right subarachnoid   spaces may be pseudo-SAH.    Findings were discussed with ISAURA LUIS on  5/19/2024 at 3:21 PM by   Dr. Ibrahim with read back confirmation.        LABS:  Na: 140 (05-19 @ 14:14)  K: 3.9 (05-19 @ 14:14)  Cl: 108 (05-19 @ 14:14)  CO2: 16 (05-19 @ 14:14)  BUN: 10 (05-19 @ 14:14)  Cr: 1.00 (05-19 @ 14:14)  Glu: 163(05-19 @ 14:14)    Hgb: 12.7 (05-19 @ 14:14)  Hct: 38.3 (05-19 @ 14:14)  WBC: 12.10 (05-19 @ 14:14)  Plt: 126 (05-19 @ 14:14)    INR: 1.26 05-19-24 @ 14:14  PTT: 102.0 05-19-24 @ 14:14      A/P":  right BG IPH with IVH and possible right MCA hypodensity r/o ischemic stroke with hemorrhagic trasnformation, in the [id number] of tPA and heparin drip  neuro checks q 1 hr  mannitol 100 grm now  repeat CT head/CTA due to blown pupils   keep -140 mmhg , nicardipine as needed   reverse tpa , has low fibrinogen with cryoprecepitate, repeat fibrinogen levels 1 hour post cryo  please give protamine sulfate   keep head elevated midline   start 2 % at 60 ml/hr, BMP q 6 hr sodium goal 145-150   I explained to the family that i am very worried about hematoma expansion , and that he cannot be anticoagulated for his PE at this point in time   NS on consult   rest of management per CICU team  plan discussed with NS and CICU team      40 critical time at risk for brain herniation and death    HPI:  53 yo man presented with saddle PE s/p tPA today and heparin drip, NSCU consulted for sudden onset worsening LOC, CT head showing BG IPH with IVH.       T(C): 36.5 (05-19-24 @ 15:30), Max: 36.5 (05-19-24 @ 15:30)  HR: 146 (05-19-24 @ 17:30) (86 - 146)  BP: 137/73 (05-19-24 @ 13:45) (137/73 - 146/93)  RR: 18 (05-19-24 @ 17:30) (8 - 44)  SpO2: 96% (05-19-24 @ 17:30) (80% - 100%)  05-19-24 @ 07:01  -  05-19-24 @ 17:28  --------------------------------------------------------  IN: 132.5 mL / OUT: 360 mL / NET: -227.5 mL    chlorhexidine 0.12% Liquid 15 milliLiter(s) Oral Mucosa every 12 hours  chlorhexidine 2% Cloths 1 Application(s) Topical daily  chlorhexidine 4% Liquid 1 Application(s) Topical <User Schedule>  mannitol 20% IVPB 100 Gram(s) IV Intermittent once  norepinephrine Infusion 0.05 MICROgram(s)/kG/Min IV Continuous <Continuous>  propofol Infusion 10 MICROgram(s)/kG/Min IV Continuous <Continuous>  protamine  IVPB 50 milliGRAM(s) IV Intermittent once  sodium chloride 0.9% lock flush 10 milliLiter(s) IV Push every 1 hour PRN  sodium chloride 2% . 1000 milliLiter(s) IV Continuous <Continuous>  sugammadex Injectable 225 milliGRAM(s) IV Push once  Mode: AC/ CMV (Assist Control/ Continuous Mandatory Ventilation), RR (machine): 14, TV (machine): 550, FiO2: 100, PEEP: 5, ITime: 9, MAP: 12, PIP: 23    exam:   he was just intubated and paralyzed  pupils 5 mm bilaterally not reactive, no corneals , no gag or cough 0/5 all over       IMPRESSION:    Acute right-sided intraparenchymal hemorrhage, centered at basal ganglia   with intraventricular extension. Midline shift, but more impressive   basilar cistern effacement from cerebral edema.    Right hemispheric edema and loss of cortical distinction suspicious for   MCA infarct. Follow-up is recommended. Density in right subarachnoid   spaces may be pseudo-SAH.    Findings were discussed with ISAURA LUIS on  5/19/2024 at 3:21 PM by   Dr. Ibrahim with read back confirmation.        LABS:  Na: 140 (05-19 @ 14:14)  K: 3.9 (05-19 @ 14:14)  Cl: 108 (05-19 @ 14:14)  CO2: 16 (05-19 @ 14:14)  BUN: 10 (05-19 @ 14:14)  Cr: 1.00 (05-19 @ 14:14)  Glu: 163(05-19 @ 14:14)    Hgb: 12.7 (05-19 @ 14:14)  Hct: 38.3 (05-19 @ 14:14)  WBC: 12.10 (05-19 @ 14:14)  Plt: 126 (05-19 @ 14:14)    INR: 1.26 05-19-24 @ 14:14  PTT: 102.0 05-19-24 @ 14:14      A/P":  right BG IPH with IVH and possible right MCA hypodensity r/o ischemic stroke with hemorrhagic trasnformation, in the [id number] of tPA and heparin drip  IVH score 3  NIHSS 42  neuro checks q 1 hr  mannitol 100 grm now  repeat CT head/CTA due to blown pupils   keep -140 mmhg , nicardipine as needed   reverse tpa , has low fibrinogen with cryoprecepitate, repeat fibrinogen levels 1 hour post cryo  please give protamine sulfate   keep head elevated midline   start 2 % at 60 ml/hr, BMP q 6 hr sodium goal 145-150   I explained to the family that i am very worried about hematoma expansion , and that he cannot be anticoagulated for his PE at this point in time   NS on consult   rest of management per CICU team  plan discussed with NS and CICU team      40 critical time at risk for brain herniation and death

## 2024-05-19 NOTE — INITIAL ORGAN DONATION REFERRAL - NSORGANDONATIONCLINICALTRIGGER_GEN_ALL_CORE
Absence of TWO or more brain stem reflexes/[place] Coma Scale is less than or equal to 5/Family discussion withdrawal of life-sustaining therapies is anticipated

## 2024-05-19 NOTE — H&P ADULT - NSHPPHYSICALEXAM_GEN_ALL_CORE
VITALS:   T(C): 36.5 (05-19-24 @ 15:30), Max: 36.5 (05-19-24 @ 15:30)  HR: 106 (05-19-24 @ 15:30) (106 - 122)  BP: 137/73 (05-19-24 @ 13:45) (137/73 - 146/93)  RR: 43 (05-19-24 @ 15:30) (24 - 43)  SpO2: 99% (05-19-24 @ 15:30) (84% - 100%)    GENERAL: uncomfortable, lethargic  HEAD:  Atraumatic, Normocephalic  NECK: Supple  CHEST/LUNG: CTABL; increased work of breathing  HEART: RRR. No M/R/G  ABDOMEN: Soft, nontender, non-distended, normoactive BS  EXTREMITIES:  2+ Peripheral Pulses. trace extremity edema  NERVOUS SYSTEM:  Pt lethargic. Withdrawals to painful stimuli. Left sided motor deficits in upper and lower extremities.   PSYCH: unable to assess  SKIN: No rashes or lesions

## 2024-05-19 NOTE — H&P ADULT - NSHPSOCIALHISTORY_GEN_ALL_CORE
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     Pt drinks socially (about 2 drinks a week).    Pt does not smoke tobacco or use illicit drugs.

## 2024-05-19 NOTE — H&P ADULT - ASSESSMENT
Pt is a 54 year old male with a past medical history of HTN who presented from an OSH with a saddle pulmonary embolus s/p tPA on heparin drip. Now on HF NC with left sided focal deficits.     ====================ASSESSMENT======================    Plan:  ====================NEUROLOGY=======================  Pt lethargic, withdraws to pain stimuli    #brain hemmorhage  - Pt with left sided neuro deficits   - CT head consistent with intraparenchymal and intraventricular hemorrhage   - Hemorrhage iso tPA and heparin drip  - NSGY and Neurology consulted  - hold heparin pending recs      ====================RESPIRATORY======================  #Saddle pulmonary embolism   - Pt presented to OSH with sob and chest pain  - CT PE showed saddle pulmonary embolus with RV strain  - Pt received 90 mg tPA and was started on heparin gtt  - Pt initially on BiPAP, transitioned to HFNC, satting well   - TTE with RV strain and RVOT Doppler profile consistent with an elevated pulmonary vascular resistance  - c/w HF NC, titrate as tolerated  - hold heparin iso brain hemorrhage  - ctm O2 sats and abg values    ====================CARDIOVASCULAR==================    ====================/RENAL========================    ====================GI/NUTRITION=====================    ====================SKIN=============================    ====================INFECTIOUS DISEASE================    ====================ENDOCRINE=======================    ====================HEMATOLOGIC/DVT PPx=============    ====================ETHICS=========================== Pt is a 54 year old male with a past medical history of HTN who presented from an OSH with a saddle pulmonary embolus s/p tPA on heparin drip. Now on HF NC with left sided focal deficits.     ====================ASSESSMENT======================    Plan:  ====================NEUROLOGY=======================  Pt lethargic, withdraws to pain stimuli    #brain hemmorhage  - Pt with left sided neuro deficits   - CT head consistent with intraparenchymal and intraventricular hemorrhage   - Hemorrhage iso tPA and heparin drip  - NSGY and Neurology consulted  - hold heparin pending recs      ====================RESPIRATORY======================  #Saddle pulmonary embolism   - Pt presented to OSH with sob and chest pain  - CT PE showed saddle pulmonary embolus with RV strain  - Pt received 90 mg tPA (infusion completed at 11:20) and was started on heparin gtt  - Pt initially on BiPAP, transitioned to HFNC, satting 90  - Lactate 7.3  - TTE with RV strain and RVOT Doppler profile consistent with an elevated pulmonary vascular resistance  - c/w HF NC, titrate as tolerated  - hold heparin iso brain hemorrhage  - ctm O2 sats and abg values    ====================CARDIOVASCULAR==================  #Right heart strain  - iso saddle PE  - Not concerning for cor pulmonale   - will continue to monitor    #HTN  - Pt on candesartan 32 mg at home  - BP elevated   - start nicardipine gtt      ====================/RENAL========================  JAZMIN      ====================GI/NUTRITION=====================  NPO      ====================SKIN=============================      ====================INFECTIOUS DISEASE================    ====================ENDOCRINE=======================    ====================HEMATOLOGIC/DVT PPx=============    ====================ETHICS=========================== Pt is a 54 year old male with a past medical history of HTN who presented from an OSH with a saddle pulmonary embolus s/p tPA on heparin drip. Now on HF NC with left sided focal deficits.     ====================ASSESSMENT======================    Plan:  ====================NEUROLOGY=======================  Pt lethargic, withdraws to pain stimuli    #brain hemmorhage  - Pt with left sided neuro deficits   - CT head consistent with intraparenchymal and intraventricular hemorrhage   - Hemorrhage iso tPA and heparin drip  - NSGY and Neurology consulted  - hold heparin pending recs      ====================RESPIRATORY======================  #Saddle pulmonary embolism   - Pt presented to OSH with sob and chest pain  - CT PE showed saddle pulmonary embolus with RV strain  - Pt received 90 mg tPA (infusion completed at 11:20) and was started on heparin gtt  - Pt initially on BiPAP, transitioned to HFNC, satting 90  - Lactate 7.3  - TTE with RV strain and RVOT Doppler profile consistent with an elevated pulmonary vascular resistance  - c/w HF NC, titrate as tolerated  - hold heparin iso brain hemorrhage  - ctm O2 sats and abg values    ====================CARDIOVASCULAR==================  #Right heart strain  - iso saddle PE  - Not concerning for cor pulmonale   - will continue to monitor    #HTN  - Pt on candesartan 32 mg at home  - BP elevated   - start nicardipine gtt      ====================/RENAL========================  JAZMIN      ====================GI/NUTRITION=====================  NPO      ====================SKIN=============================  JAZMIN    ====================INFECTIOUS DISEASE================  JAZMIN    ====================ENDOCRINE=======================  JAZMIN  ====================HEMATOLOGIC/DVT PPx=============    ====================ETHICS=========================== Pt is a 54 year old male with a past medical history of HTN who presented from an OSH with a saddle pulmonary embolus s/p tPA on heparin drip. Now on HF NC with left sided focal deficits.     ====================ASSESSMENT======================    Plan:  ====================NEUROLOGY=======================  Pt lethargic, withdraws to pain stimuli    #brain hemmorhage  - Pt with left sided neuro deficits   - CT head consistent with intraparenchymal and intraventricular hemorrhage   - Hemorrhage iso tPA and heparin drip  - NSGY and Neurology consulted  - hold heparin pending recs      ====================RESPIRATORY======================  #Saddle pulmonary embolism   - Pt presented to OSH with sob and chest pain  - CT PE showed saddle pulmonary embolus with RV strain  - Pt received 90 mg tPA (infusion completed at 11:20) and was started on heparin gtt  - Pt initially on BiPAP, transitioned to HFNC, satting 90  - Lactate 7.3  - TTE with RV strain and RVOT Doppler profile consistent with an elevated pulmonary vascular resistance  - c/w HF NC, titrate as tolerated  - hold heparin iso brain hemorrhage  - ctm O2 sats and abg values    ====================CARDIOVASCULAR==================  #Right heart strain  - iso saddle PE  - Not concerning for cor pulmonale   - will continue to monitor    #HTN  - Pt on candesartan 32 mg at home  - BP elevated   - start nicardipine gtt      ====================/RENAL========================  JAZMIN      ====================GI/NUTRITION=====================  NPO      ====================SKIN=============================  JAZMIN    ====================INFECTIOUS DISEASE================  JAZMIN    ====================ENDOCRINE=======================  JAZMIN    ====================HEMATOLOGIC/DVT PPx=============  #brain hemorrhage as above     ====================ETHICS===========================  FULL CODE

## 2024-05-19 NOTE — PROGRESS NOTE ADULT - SUBJECTIVE AND OBJECTIVE BOX
BLAIR AGUILAR  MRN-[id number]  Patient is a 54y old  Male who presents with a chief complaint of Pulmonary Embolus (19 May 2024 17:40)    HPI: 54M Hx HTN who presented to [facility] with chest pain and sob starting this morning. Per spouse, pt woke up this morning with chest pain and sob. Pt had previously been healthy without recent complaints. No recent illnesses or traveling. Wife called 911, pt received aspirin and nitroglycerin and was brought to [facility]. In the ED, pt was hypoxic, hypotensive and vomiting. EKG and bedside echo were consistent with right heart strain. CT PE showed a saddle embolus with right heart strain. Pt was placed on BiPAP and heparin gtt was begun. Pt was given 90 mg of tPA. Pt had an episode of "eye rolling" and received IV ativan 2 mg x2 due to concern for seizure activity. Pt was subsequently transferred to [facility]. In the EMS, pt was agitated and received versed 4.5.  On presentation, pt was satting well on BiPAP with elevated blood pressures. Pt was transitioned to HF NC and A-line was placed. TTE showed RV strain. ABG significant for pO2 64 and lactate 4.4. (19 May 2024 14:39)    CTH 5/19 3PM - Acute right-sided intraparenchymal hemorrhage, centered at basal ganglia with intraventricular extension. Midline shift, but more impressive basilar cistern effacement from cerebral edema.  Right hemispheric edema and loss of cortical distinction suspicious for MCA infarct. Follow-up is recommended. Density in right subarachnoid spaces may be pseudo-SAH.    CTH 5/19 6:30PM - No significant interval change in the intraparenchymal hematoma in the right basal nuclei, intraventricular blood, ventricular caliber, mass effect, and pseudosubarachnoid hemorrhages versus subarachnoid hemorrhages in the left frontoparietal lobes.  As before findings suspicious for a large acute right MCA distribution infarct.    CTA HEAD AND NECK 5/19 6:30PM - There is a paucity of intravascular contrast on both the initial scan and on the subsequent delayed CTA of the head and neck. Specifically, the M2 segments bilaterally, A2 segments bilaterally, and distal P2 segments bilaterally are not well opacified on either scan. This raises suspicion for global hypoxic ischemic encephalopathy.    Discussed above findings with Neurosurgery resident Dr. Urias - CTH with diffuse brainstem edema and effacement of cisterns. On exam, pupils blown, no corneals, no cough/gag, VOR.  No neurosurgical intervention.  GOC discussion w/ family given no brainstem reflexes.    REVIEW OF SYSTEMS:  - Unable to obtain     ICU Vital Signs Last 24 Hrs  T(C): 35.5 (19 May 2024 19:15), Max: 36.5 (19 May 2024 15:30)  T(F): 95.9 (19 May 2024 19:15), Max: 97.7 (19 May 2024 15:30)  HR: 144 (19 May 2024 19:15) (86 - 146)  BP: 137/73 (19 May 2024 13:45) (137/73 - 146/93)  BP(mean): 100 (19 May 2024 13:45) (100 - 113)  ABP: 88/75 (19 May 2024 19:15) (82/43 - 203/78)  ABP(mean): 90 (19 May 2024 19:15) (53 - 113)  RR: 20 (19 May 2024 19:15) (8 - 44)  SpO2: 99% (19 May 2024 19:15) (80% - 100%)    O2 Parameters below as of 19 May 2024 19:00  Patient On (Oxygen Delivery Method): ventilator    O2 Concentration (%): 100      Mode: AC/ CMV (Assist Control/ Continuous Mandatory Ventilation), RR (machine): 18, TV (machine): 550, FiO2: 100, PEEP: 5, ITime: 0.9      I&O's Summary    19 May 2024 07:01  -  19 May 2024 19:32  --------------------------------------------------------  IN: 823.6 mL / OUT: 3310 mL / NET: -2486.4 mL  ============================I/O===========================   I&O's Detail    19 May 2024 07:01  -  19 May 2024 19:32  --------------------------------------------------------  IN:    IV PiggyBack: 70 mL    IV PiggyBack: 550 mL    NiCARdipine: 62.5 mL    Norepinephrine: 21.1 mL    sodium chloride 2%: 120 mL  Total IN: 823.6 mL    OUT:    FentaNYL: 0 mL    Heparin: 0 mL    Indwelling Catheter - Urethral (mL): 3310 mL  Total OUT: 3310 mL    Total NET: -2486.4 mL  ============================ LABS =========================                     13.4   15.43 )-----------( 125      ( 19 May 2024 18:43 )             41.5     05-19    137  |  107  |  11  ----------------------------<  164<H>  4.5   |  16<L>  |  1.14    Ca    8.7      19 May 2024 18:43  Phos  2.9     05-19  Mg     2.3     05-19    TPro  7.3  /  Alb  4.0  /  TBili  1.5<H>  /  DBili  x   /  AST  27  /  ALT  22  /  AlkPhos  80  05-19    Troponin T, High Sensitivity Result: 469 ng/L (05-19-24 @ 14:14    LIVER FUNCTIONS - ( 19 May 2024 18:43 )  Alb: 4.0 g/dL / Pro: 7.3 g/dL / ALK PHOS: 80 U/L / ALT: 22 U/L / AST: 27 U/L / GGT: x           PT/INR - ( 19 May 2024 14:14 )   PT: 13.8 sec;   INR: 1.26 ratio    PTT - ( 19 May 2024 14:14 )  PTT:102.0 sec    ABG - ( 19 May 2024 18:45 )  pH, Arterial: 7.26  pH, Blood: x     /  pCO2: 44    /  pO2: 191   / HCO3: 20    / Base Excess: -7.2  /  SaO2: 99.6      Blood Gas Arterial, Lactate: 3.3 mmol/L (05-19-24 @ 18:45)  Blood Gas Venous - Lactate: 8.1 mmol/L (05-19-24 @ 16:58)  Blood Gas Arterial, Lactate: 7.3 mmol/L (05-19-24 @ 15:42)  Lactate, Blood: 5.0 mmol/L (05-19-24 @ 14:14)  Blood Gas Arterial, Lactate: 4.2 mmol/L (05-19-24 @ 14:13)    Urinalysis Basic - ( 19 May 2024 18:43 )    Color: x / Appearance: x / SG: x / pH: x  Gluc: 164 mg/dL / Ketone: x  / Bili: x / Urobili: x   Blood: x / Protein: x / Nitrite: x   Leuk Esterase: x / RBC: x / WBC x   Sq Epi: x / Non Sq Epi: x / Bacteria: x  ======================Micro/Rad/Cardio=================  Telemetry: Reviewed   EKG: Reviewed  CXR: Reviewed  Echo: Reviewed  ======================================================  PAST MEDICAL & SURGICAL HISTORY:  HTN (hypertension)    Obesity    H/O hernia repair    A/P: 54M Hx HTN who presented from an OSH with a saddle pulmonary embolus s/p tPA on heparin drip, intubated for worsening mental status and apnea.  Course c/b right BG IPH with IVH im setting of tPA and a large acute right MCA distribution infarct.  Patient now with no brainstem reflexes.  Repeat CTH with diffuse brainstem edema and effacement of cisterns. No neurosurgical intervention per Neurosurgery team  ====================NEUROLOGY=======================  #right BG IPH with IVH im setting of tPA  #large acute right MCA distribution infarct  - repeat CTH:  No significant interval change in the intraparenchymal hematoma in the right basal nuclei  intraventricular blood, ventricular caliber, mass effect, and pseudosubarachnoid hemorrhages versus   subarachnoid hemorrhages in the left frontoparietal lobes.  As before findings suspicious for a large   acute right MCA distribution infarct  - CTA HEAD AND NECK 5/19 6:30PM - M2 segments bilaterally, A2 segments bilaterally, and distal P2   segments bilaterally are not well opacified on either scan, suspicion for global hypoxic ischemic encephalopathy  - per Neurosurgery team, CTH with diffuse brainstem edema and effacement of cisterns  - On exam, pupils blown, no corneals, no cough/gag, VOR  - No neurosurgical intervention per Neurosurgery team  - GOC discussion w/ family given no brainstem reflexes  - Wife states "waiting on her daughter from [place] before withdrawing care"  - Continue with medical management, DC cryo, no further blood products - as discussed with wife     ====================RESPIRATORY======================  #Saddle pulmonary embolism   - Pt presented to OSH with sob and chest pain  - CT PE showed saddle pulmonary embolus with RV strain  - Pt received 90 mg tPA (infusion completed at 11:20) and was started on heparin gtt  - Pt initially on BiPAP, transitioned to HFNC, satting 90 - intubated due to worsening mental status   - TTE with RV strain and RVOT Doppler profile consistent with an elevated pulmonary vascular resistance  - hold heparin iso brain hemorrhage, initial lactate 7.3 -> 3.3  - GOC as above   ====================CARDIOVASCULAR==================  #Right heart strain due to saddle PE  #Hypotension   - c/w with Levophed   ====================/RENAL========================  JAZMIN    ====================GI/NUTRITION=====================  NPO    ====================INFECTIOUS DISEASE================  JAZMIN    ====================ENDOCRINE=======================  JAZMIN    ====================HEMATOLOGIC/DVT PPx=============  #right BG IPH with IVH im setting of tPA  - as above    ====================ETHICS===========================  FULL CODE at this time although wife is aware patient without brainstem reflexes with no neurosurgical intervention  - HCP (Yared Aguilar - wife) states she is awaiting on her daughter who is in flight from [place] before withdrawing care  - DC additional cryo ordered, no further blood products as discussed with wife   ======================= DISPOSITION  =====================  [X] Critical   [ ] Guarded    [ ] Stable    [X] Maintain in CICU  [ ] Downgrade to Telemetry  [ ] Discharge Home    Patient requires continuous monitoring with bedside rhythm monitoring, pulse ox monitoring, and intermittent blood gas analysis. Care plan discussed with ICU care team. Patient remained critical and at risk for life threatening decompensation.  Patient seen, examined and plan discussed with CCU team during rounds.     I have personally provided 30 minutes of critical care time excluding time spent on separate procedures, in addition to initial critical care time provided by the CICU Attending, Dr. Ramez Enrique [facility] [phone]

## 2024-05-19 NOTE — CHART NOTE - NSCHARTNOTEFT_GEN_A_CORE
Interval CTH reviewed w/ diffuse brainstem edema and effacement of cisterns. On exam, pupils blown, - corneals, - cough/gag, -VOR  - no neurosurgical intervention  - GOC discussion w/ family given no brainstem reflexes

## 2024-05-19 NOTE — PROGRESS NOTE ADULT - SUBJECTIVE AND OBJECTIVE BOX
Called to patient's bedside for intubation.  Therapy initiated by primary medical team.    Patient Assessment:     Baseline Vital Signs:  BP: 90/50  HR: 120  RR: -  SpO2: 99     Medications Administered: 4mg etomidate, 100mg rocuronium, 200mcg phenylephrine    Intubation:      [ ] Direct Laryngoscopy    [X] Video-Assisted Laryngoscopy   [ ] Fiberoptic Intubation    Blade: Glide3  Cormack-Lehane View: [ ] 1     [ ] 2A     [ ] 2B     [ ] 3     [ ]  4  ETT Size: 7.5  Marking at Teeth: 22    Post-Intubation Vital Signs:  BP: 110/70  HR: 122  RR: -  SpO2: 99    Positive end-tidal carbon dioxide via EasyCap, positive bilateral breath sounds.  CXR to be reviewed by primary team.  Atraumatic intubation with no complications.

## 2024-05-19 NOTE — H&P ADULT - NSICDXFAMILYHX_GEN_ALL_CORE_FT
FAMILY HISTORY:  Father  Still living? Unknown  FH: prostate cancer, Age at diagnosis: Age Unknown    Mother  Still living? Unknown  FH: breast cancer, Age at diagnosis: Age Unknown

## 2024-05-20 NOTE — CHART NOTE - NSCHARTNOTEFT_GEN_A_CORE
Hypothermic, now with low grade fevers.  Discussed case with Tee [phone] from [facility] - send full infectious work up, start empiric Abx coverage (Vanco/Zosyn).  Given ONEIL will dose Vanco by level.  Check trough in AM.  Worsening hypernatremia 154 -> 158 will start D5W gtt 100ml/hr, check CMP q2hrs with goal Na < 150 in normal range.

## 2024-05-20 NOTE — CHART NOTE - NSCHARTNOTEFT_GEN_A_CORE
Na 137 -> 154 s/p Mannitol during the day with significant decrease in UOP, now ~400ml/hr.  Remains on 2% Na 60ml/hr - will DC now.  Discussed case with Tee [phone] from [facility], will obtain CMP q4hrs and continue with full labs q6h (CBC, Mag, Phos, Coags, ABG)

## 2024-05-20 NOTE — PROGRESS NOTE ADULT - ASSESSMENT
54M Hx HTN who presented from an OSH with a saddle pulmonary embolus s/p tPA on heparin drip, intubated for worsening mental status and apnea.  Course c/b right BG IPH with IVH im setting of tPA and a large acute right MCA distribution infarct.  Patient now with no brainstem reflexes.  Repeat CTH with diffuse brainstem edema and effacement of cisterns. No neurosurgical intervention per Neurosurgery team  ====================NEUROLOGY=======================  #right BG IPH with IVH im setting of tPA  #large acute right MCA distribution infarct  - repeat CTH:  No significant interval change in the intraparenchymal hematoma in the right basal nuclei  intraventricular blood, ventricular caliber, mass effect, and pseudosubarachnoid hemorrhages versus   subarachnoid hemorrhages in the left frontoparietal lobes.  As before findings suspicious for a large   acute right MCA distribution infarct  - CTA HEAD AND NECK 5/19 6:30PM - M2 segments bilaterally, A2 segments bilaterally, and distal P2   segments bilaterally are not well opacified on either scan, suspicion for global hypoxic ischemic encephalopathy  - per Neurosurgery team, CTH with diffuse brainstem edema and effacement of cisterns  - On exam, pupils blown, no corneals, no cough/gag, VOR  - No neurosurgical intervention per Neurosurgery team  - GOC discussion w/ family given no brainstem reflexes  - Wife states "waiting on her daughter from [place] before withdrawing care"  - Continue with medical management, DC cryo, no further blood products - as discussed with wife     ====================RESPIRATORY======================  #Saddle pulmonary embolism   - Pt presented to OSH with sob and chest pain  - CT PE showed saddle pulmonary embolus with RV strain  - Pt received 90 mg tPA (infusion completed at 11:20) and was started on heparin gtt  - Pt initially on BiPAP, transitioned to HFNC, satting 90 - intubated due to worsening mental status   - TTE with RV strain and RVOT Doppler profile consistent with an elevated pulmonary vascular resistance  - hold heparin iso brain hemorrhage, initial lactate 7.3 -> 3.3  - GOC as above   ====================CARDIOVASCULAR==================  #Right heart strain due to saddle PE  #Hypotension   - c/w with Levophed   ====================/RENAL========================  JAZMIN    ====================GI/NUTRITION=====================  NPO    ====================INFECTIOUS DISEASE================  JAZMIN    ====================ENDOCRINE=======================  JAZMIN    ====================HEMATOLOGIC/DVT PPx=============  #right BG IPH with IVH im setting of tPA  - as above    ====================ETHICS===========================  FULL CODE at this time although wife is aware patient without brainstem reflexes with no neurosurgical intervention  - [facility] (Whitneyvalentin Aguilar - wife) states she is awaiting on her daughter who is in flight from [place] before withdrawing care  - DC additional cryo ordered, no further blood products as discussed with wife    54M Hx HTN who presented from an OSH with a saddle pulmonary embolus s/p tPA on heparin drip, intubated for worsening mental status and apnea.  Course c/b right BG IPH with IVH im setting of tPA and a large acute right MCA distribution infarct.  Patient now with no brainstem reflexes.  Repeat CTH with diffuse brainstem edema and effacement of cisterns. No neurosurgical intervention per Neurosurgery team    ====================NEUROLOGY=======================  #right BG IPH with IVH im setting of tPA  #large acute right MCA distribution infarct  - repeat CTH:  No significant interval change in the intraparenchymal hematoma in the right basal nuclei  intraventricular blood, ventricular caliber, mass effect, and pseudosubarachnoid hemorrhages versus   subarachnoid hemorrhages in the left frontoparietal lobes.  As before findings suspicious for a large   acute right MCA distribution infarct  - CTA HEAD AND NECK 5/19 6:30PM - M2 segments bilaterally, A2 segments bilaterally, and distal P2   segments bilaterally are not well opacified on either scan, suspicion for global hypoxic ischemic encephalopathy  - per Neurosurgery team, CTH with diffuse brainstem edema and effacement of cisterns  - On exam, pupils blown, no corneals, no cough/gag, VOR  - No neurosurgical intervention per Neurosurgery team  - GOC discussion w/ family given no brainstem reflexes  - Wife states "waiting on her daughter from [place] before withdrawing care"  - Continue with medical management, DC cryo, no further blood products - as discussed with wife     ====================RESPIRATORY======================  #Saddle pulmonary embolism   - Pt presented to OSH with sob and chest pain  - CT PE showed saddle pulmonary embolus with RV strain  - Pt received 90 mg tPA (infusion completed at 11:20) and was started on heparin gtt  - Pt initially on BiPAP, transitioned to HFNC, satting 90 - intubated due to worsening mental status   - TTE with RV strain and RVOT Doppler profile consistent with an elevated pulmonary vascular resistance  - hold heparin iso brain hemorrhage, initial lactate 7.3 -> 3.3  - GOC as above     ====================CARDIOVASCULAR==================  #Right heart strain due to saddle PE  #Hypotension   - c/w with Levophed   ====================/RENAL========================  Hypernatremia   - Trend Na q2h   - On D5W @ 100 cc/hr   - Goal Na 145-150     ====================GI/NUTRITION=====================  NPO   - Plan to place OGT today and give tube feeds     ====================INFECTIOUS DISEASE================  #Febrile   - BCx sent, pending   - C/w Zosyn   - Send MRSA swab, hold vanc for now     ====================ENDOCRINE=======================  JAZMIN    ====================HEMATOLOGIC/DVT PPx=============  #right BG IPH with IVH im setting of tPA  - as above     =====================SKIN===========================  R IJ triple lumen Central line - 5/19/2024   L radial A-line - 5/19/2024     ====================ETHICS===========================  FULL CODE at this time although wife is aware patient without brainstem reflexes with no neurosurgical intervention  - HCP (Vikgiovannavalentin Aguilar - wife) states she is awaiting on her daughter who is in flight from [place] before withdrawing care  - DC additional cryo ordered, no further blood products as discussed with wife

## 2024-05-20 NOTE — CONSULT NOTE ADULT - ASSESSMENT
54M hx HTN tx [facility] s/p episode chest pain/SOB and found w/ saddle PE and right heart strain. Placed on BiPAP and heparin gtt was begun. Pt was given 90 mg of tPA. Pt had an episode of eye rolling and received ativan for c/f seizure activity. Upon arrival to CICU, CTH obtained for L arm weakness (per wife, SIMONE present since EMS arrived at their house); at that time was AOx3. CTH w/ R BG IPH w/ some IVH; no hydro. Ptt 102. Plts 126. INR 1.26. Exam: postical, no EO, R pupil 4R, L pupil 6R, BUE spon, BLE 0/5    Now intubated for apnea (paralyzed w/ vincent). B/l pupils blown. Given mannitol, hyperventilating. Going down for CTH as soon as CICU deems hemodynamically stable    - hold all AC/AP  - Risk of expansion of intracranial hemorrhage not insignificant; from this perspective, rec CPP, protamine sulfate, IVC filter. Risk benefit situation as pt also has a large PE w/ R heart strain  - interval CTH now  - MRI   - Stroke neurology consult  - keppra load and then 500 BID  - SBP <140  - Risk of any intervention including surgery, EVD at this point contraindicated given recent administration of heparin/TPA/ASA
Unfortunate presentation of a 54M gentleman previously in good health who presented to [facility] with chest pain and SOB, diagnosed with large saddle PE.  S/p tPA and transfer to [facility]  Complicated by catastrophic intracranial bleed, cerebral edema, loss of brainstem reflexes.    - Patient's family aware of prognosis  - Referral to [facility] made for organ donation evaluation  - Unfortunately no further interventions available at this time    Case discussed with PERT interventionalist on call, Dr Edward Salinas MD  PGY-4, Cardiology Fellow    Please check [email or web address] password: [email or web address]" for cardiology service schedule and contact information.   *Please note that all recommendations are incomplete until attending attestation*

## 2024-05-20 NOTE — PROGRESS NOTE ADULT - SUBJECTIVE AND OBJECTIVE BOX
Patient is a 54y old  Male who presents with a chief complaint of Pulmonary Embolus (20 May 2024 06:39)    HPI:  Patient is a 54 year old male with a past medical history of HTN who presented to [facility] with chest pain and sob starting this morning. Per spouse, pt woke up this morning with chest pain and sob. Pt had previously been healthy without recent complaints. No recent illnesses or traveling. Wife called 911, pt received aspirin and nitroglycerin and was brought to [facility]. In the ED, pt was hypoxic, hypotensive and vomiting. EKG and bedside echo were consistent with right heart strain. CT PE showed a saddle embolus with right heart strain. Pt was placed on BiPAP and heparin gtt was begun. Pt was given 90 mg of tPA. Pt had an episode of "eye rolling" and received IV ativan 2 mg x2 due to concern for seizure activity. Pt was subsequently transferred to [facility]. In the EMS, pt was agitated and received versed 4.5.     On presentation, pt was satting well on BiPAP with elevated blood pressures. Pt was transitioned to HF NC and A-line was placed. TTE showed RV strain. ABG significant for pO2 64 and lactate 4.4. (19 May 2024 14:39)       INTERVAL HPI/OVERNIGHT EVENTS:   No overnight events   Afebrile, hemodynamically stable     Subjective:    REVIEW OF SYSTEMS:    CONSTITUTIONAL: No weakness, fevers or chills  EYES/ENT: No visual changes;  No vertigo or throat pain   NECK: No pain or stiffness  RESPIRATORY: No cough, wheezing, hemoptysis; No shortness of breath  CARDIOVASCULAR: No chest pain or palpitations  GASTROINTESTINAL: No abdominal or epigastric pain. No nausea, vomiting, or hematemesis; No diarrhea or constipation. No melena or hematochezia.  GENITOURINARY: No dysuria, frequency or hematuria  NEUROLOGICAL: No numbness or weakness  SKIN: No itching, rashes    ICU Vital Signs Last 24 Hrs  T(C): 37.9 (20 May 2024 05:00), Max: 37.9 (20 May 2024 05:00)  T(F): 100.2 (20 May 2024 05:00), Max: 100.2 (20 May 2024 05:00)  HR: 116 (20 May 2024 06:45) (86 - 146)  BP: 137/73 (19 May 2024 13:45) (137/73 - 146/93)  BP(mean): 100 (19 May 2024 13:45) (100 - 113)  ABP: 136/70 (20 May 2024 06:45) (82/43 - 203/78)  ABP(mean): 87 (20 May 2024 06:45) (53 - 113)  RR: 19 (20 May 2024 06:45) (3 - 44)  SpO2: 98% (20 May 2024 06:45) (80% - 100%)    O2 Parameters below as of 20 May 2024 06:00  Patient On (Oxygen Delivery Method): ventilator    O2 Concentration (%): 40      I&O's Summary    19 May 2024 07:01  -  20 May 2024 07:00  --------------------------------------------------------  IN: 2363.7 mL / OUT: 8935 mL / NET: -6571.3 mL      Mode: AC/ CMV (Assist Control/ Continuous Mandatory Ventilation)  RR (machine): 18  TV (machine): 550  FiO2: 40  PEEP: 5  ITime: 1  MAP: 10  PIP: 18      Daily Height in cm: 187.96 (19 May 2024 13:38)    Daily     Adult Advanced Hemodynamics Last 24 Hrs  CVP(mm Hg): --  CVP(cm H2O): --  CO: --  CI: --  PA: --  PA(mean): --  PCWP: --  SVR: --  SVRI: --  PVR: --  PVRI: --    EKG/Telemetry Events:    MEDICATIONS  (STANDING):  chlorhexidine 0.12% Liquid 15 milliLiter(s) Oral Mucosa every 12 hours  chlorhexidine 2% Cloths 1 Application(s) Topical daily  chlorhexidine 4% Liquid 1 Application(s) Topical <User Schedule>  dextrose 5%. 1000 milliLiter(s) (100 mL/Hr) IV Continuous <Continuous>  levETIRAcetam  IVPB 500 milliGRAM(s) IV Intermittent two times a day  norepinephrine Infusion 0.05 MICROgram(s)/kG/Min (10.6 mL/Hr) IV Continuous <Continuous>  piperacillin/tazobactam IVPB.- 3.375 Gram(s) IV Intermittent once  piperacillin/tazobactam IVPB.- 3.375 Gram(s) IV Intermittent once  piperacillin/tazobactam IVPB.. 3.375 Gram(s) IV Intermittent every 8 hours  vasopressin Infusion 0.04 Unit(s)/Min (6 mL/Hr) IV Continuous <Continuous>    MEDICATIONS  (PRN):  sodium chloride 0.9% lock flush 10 milliLiter(s) IV Push every 1 hour PRN Pre/post blood products, medications, blood draw, and to maintain line patency      PHYSICAL EXAM:  GENERAL:   HEAD:  Atraumatic, Normocephalic  EYES: EOMI, PERRLA, conjunctiva and sclera clear  NECK: Supple, No JVD, Normal thyroid, no enlarged nodes  NERVOUS SYSTEM:  Alert & Awake.   CHEST/LUNG: B/L good air entry; No rales, rhonchi, or wheezing  HEART: S1S2 normal, no S3, Regular rate and rhythm; No murmurs  ABDOMEN: Soft, Nontender, Nondistended; Bowel sounds present  EXTREMITIES:  2+ Peripheral Pulses, No clubbing, cyanosis, or edema  LYMPH: No lymphadenopathy noted  SKIN: No rashes or lesions    LABS:                        13.4   12.63 )-----------( 127      ( 20 May 2024 00:27 )             40.2     05-20    154<H>  |  123<H>  |  12  ----------------------------<  209<H>  4.3   |  21<L>  |  1.56<H>    Ca    8.8      20 May 2024 06:40  Phos  2.2     05-20  Mg     2.4     05-20    TPro  6.6  /  Alb  3.7  /  TBili  2.0<H>  /  DBili  x   /  AST  24  /  ALT  18  /  AlkPhos  66  05-20    LIVER FUNCTIONS - ( 20 May 2024 06:40 )  Alb: 3.7 g/dL / Pro: 6.6 g/dL / ALK PHOS: 66 U/L / ALT: 18 U/L / AST: 24 U/L / GGT: x           PT/INR - ( 20 May 2024 00:27 )   PT: 13.3 sec;   INR: 1.22 ratio         PTT - ( 20 May 2024 00:27 )  PTT:24.4 sec  CAPILLARY BLOOD GLUCOSE        ABG - ( 20 May 2024 01:28 )  pH, Arterial: 7.35  pH, Blood: x     /  pCO2: 42    /  pO2: 122   / HCO3: 23    / Base Excess: -2.4  /  SaO2: 99.5                    Urinalysis Basic - ( 20 May 2024 06:40 )    Color: x / Appearance: x / SG: x / pH: x  Gluc: 209 mg/dL / Ketone: x  / Bili: x / Urobili: x   Blood: x / Protein: x / Nitrite: x   Leuk Esterase: x / RBC: x / WBC x   Sq Epi: x / Non Sq Epi: x / Bacteria: x          RADIOLOGY & ADDITIONAL TESTS:  CXR:        Care Discussed with Consultants/Other Providers [ x] YES  [ ] NO       Patient is a 54y old  Male who presents with a chief complaint of Pulmonary Embolus (20 May 2024 06:39)    HPI:  Patient is a 54 year old male with a past medical history of HTN who presented to [facility] with chest pain and sob starting this morning. Per spouse, pt woke up this morning with chest pain and sob. Pt had previously been healthy without recent complaints. No recent illnesses or traveling. Wife called 911, pt received aspirin and nitroglycerin and was brought to [facility]. In the ED, pt was hypoxic, hypotensive and vomiting. EKG and bedside echo were consistent with right heart strain. CT PE showed a saddle embolus with right heart strain. Pt was placed on BiPAP and heparin gtt was begun. Pt was given 90 mg of tPA. Pt had an episode of "eye rolling" and received IV ativan 2 mg x2 due to concern for seizure activity. Pt was subsequently transferred to [facility]. In the EMS, pt was agitated and received versed 4.5.     On presentation, pt was satting well on BiPAP with elevated blood pressures. Pt was transitioned to HF NC and A-line was placed. TTE showed RV strain. ABG significant for pO2 64 and lactate 4.4. (19 May 2024 14:39)       INTERVAL HPI/OVERNIGHT EVENTS:   Febrile overnight, BCx sent and started on zosyn and given Vanc x1. Placed on CPAP, took breaths spontaneously, BP dropped to sBP 30s and given 1x Epi, resulting in improvement of BP. Now back on Volume AC.     Subjective:    REVIEW OF SYSTEMS:    Unable to assess due to intubated status     ICU Vital Signs Last 24 Hrs  T(C): 37.9 (20 May 2024 05:00), Max: 37.9 (20 May 2024 05:00)  T(F): 100.2 (20 May 2024 05:00), Max: 100.2 (20 May 2024 05:00)  HR: 116 (20 May 2024 06:45) (86 - 146)  BP: 137/73 (19 May 2024 13:45) (137/73 - 146/93)  BP(mean): 100 (19 May 2024 13:45) (100 - 113)  ABP: 136/70 (20 May 2024 06:45) (82/43 - 203/78)  ABP(mean): 87 (20 May 2024 06:45) (53 - 113)  RR: 19 (20 May 2024 06:45) (3 - 44)  SpO2: 98% (20 May 2024 06:45) (80% - 100%)    O2 Parameters below as of 20 May 2024 06:00  Patient On (Oxygen Delivery Method): ventilator    O2 Concentration (%): 40      I&O's Summary    19 May 2024 07:01  -  20 May 2024 07:00  --------------------------------------------------------  IN: 2363.7 mL / OUT: 8935 mL / NET: -6571.3 mL      Mode: AC/ CMV (Assist Control/ Continuous Mandatory Ventilation)  RR (machine): 18  TV (machine): 550  FiO2: 40  PEEP: 5  ITime: 1  MAP: 10  PIP: 18      Daily Height in cm: 187.96 (19 May 2024 13:38)    Daily     Adult Advanced Hemodynamics Last 24 Hrs  CVP(mm Hg): --  CVP(cm H2O): --  CO: --  CI: --  PA: --  PA(mean): --  PCWP: --  SVR: --  SVRI: --  PVR: --  PVRI: --    EKG/Telemetry Events:    MEDICATIONS  (STANDING):  chlorhexidine 0.12% Liquid 15 milliLiter(s) Oral Mucosa every 12 hours  chlorhexidine 2% Cloths 1 Application(s) Topical daily  chlorhexidine 4% Liquid 1 Application(s) Topical <User Schedule>  dextrose 5%. 1000 milliLiter(s) (100 mL/Hr) IV Continuous <Continuous>  levETIRAcetam  IVPB 500 milliGRAM(s) IV Intermittent two times a day  norepinephrine Infusion 0.05 MICROgram(s)/kG/Min (10.6 mL/Hr) IV Continuous <Continuous>  piperacillin/tazobactam IVPB.- 3.375 Gram(s) IV Intermittent once  piperacillin/tazobactam IVPB.- 3.375 Gram(s) IV Intermittent once  piperacillin/tazobactam IVPB.. 3.375 Gram(s) IV Intermittent every 8 hours  vasopressin Infusion 0.04 Unit(s)/Min (6 mL/Hr) IV Continuous <Continuous>    MEDICATIONS  (PRN):  sodium chloride 0.9% lock flush 10 milliLiter(s) IV Push every 1 hour PRN Pre/post blood products, medications, blood draw, and to maintain line patency      PHYSICAL EXAM:  GENERAL: intubated, off sedation   HEAD:  Atraumatic, Normocephalic  EYES: pupils blown and nonreactive   NECK: Supple, No JVD, Normal thyroid, no enlarged nodes  NERVOUS SYSTEM:  Alert & Awake.   CHEST/LUNG: B/L good air entry; No rales, rhonchi, or wheezing  HEART: S1S2 normal, no S3, Regular rate and rhythm; No murmurs  ABDOMEN: Soft, Nontender, Nondistended; Bowel sounds present  EXTREMITIES:  2+ Peripheral Pulses, + b/l LE edema  LYMPH: No lymphadenopathy noted  SKIN: No rashes or lesions    LABS:                        13.4   12.63 )-----------( 127      ( 20 May 2024 00:27 )             40.2     05-20    154<H>  |  123<H>  |  12  ----------------------------<  209<H>  4.3   |  21<L>  |  1.56<H>    Ca    8.8      20 May 2024 06:40  Phos  2.2     05-20  Mg     2.4     05-20    TPro  6.6  /  Alb  3.7  /  TBili  2.0<H>  /  DBili  x   /  AST  24  /  ALT  18  /  AlkPhos  66  05-20    LIVER FUNCTIONS - ( 20 May 2024 06:40 )  Alb: 3.7 g/dL / Pro: 6.6 g/dL / ALK PHOS: 66 U/L / ALT: 18 U/L / AST: 24 U/L / GGT: x           PT/INR - ( 20 May 2024 00:27 )   PT: 13.3 sec;   INR: 1.22 ratio         PTT - ( 20 May 2024 00:27 )  PTT:24.4 sec  CAPILLARY BLOOD GLUCOSE        ABG - ( 20 May 2024 01:28 )  pH, Arterial: 7.35  pH, Blood: x     /  pCO2: 42    /  pO2: 122   / HCO3: 23    / Base Excess: -2.4  /  SaO2: 99.5                    Urinalysis Basic - ( 20 May 2024 06:40 )    Color: x / Appearance: x / SG: x / pH: x  Gluc: 209 mg/dL / Ketone: x  / Bili: x / Urobili: x   Blood: x / Protein: x / Nitrite: x   Leuk Esterase: x / RBC: x / WBC x   Sq Epi: x / Non Sq Epi: x / Bacteria: x          RADIOLOGY & ADDITIONAL TESTS:  CXR:        Care Discussed with Consultants/Other Providers [ x] YES  [ ] NO

## 2024-05-20 NOTE — CONSULT NOTE ADULT - SUBJECTIVE AND OBJECTIVE BOX
Vascular Consult Note   [Please check [email or web address] for cardiology service schedule and contact information]    HPI:  Patient is a 54 year old male with a past medical history of HTN who presented to [facility] with chest pain and sob starting this morning. Per spouse, pt woke up this morning with chest pain and sob. Pt had previously been healthy without recent complaints. No recent illnesses or traveling. Wife called 911, pt received aspirin and nitroglycerin and was brought to [facility]. In the ED, pt was hypoxic, hypotensive and vomiting. EKG and bedside echo were consistent with right heart strain. CT PE showed a saddle embolus with right heart strain. Pt was placed on BiPAP and heparin gtt was begun. Pt was given 90 mg of tPA. Pt had an episode of "eye rolling" and received IV ativan 2 mg x2 due to concern for seizure activity. Pt was subsequently transferred to [facility]. In the EMS, pt was agitated and received versed 4.5.     On presentation, pt was satting well on BiPAP with elevated blood pressures. Pt was transitioned to HF NC and A-line was placed. TTE showed RV strain. ABG significant for pO2 64 and lactate 4.4. (19 May 2024 14:39)    Unfortunate presentation of a 54M gentleman previously in good health who presented to [facility] with chest pain and SOB, diagnosed with large saddle PE.  S/p tPA and transfer to [facility]  Per report patient agitated prior to and during transfer- given total ~4mg IV Ativan and ~4mg IV Versed prior to arrival to [facility]  Upon arrival was saturating well on Bipap but impaired mentation.  A-line placed and patient noted to be hypertensive in the 200s systolic for which cardene gtt was started.  Patient then noted to have left sided weakness in addition to worsening mental status- taken for emergent CT head which was notable for   " Acute right-sided intraparenchymal hemorrhage, centered at basal ganglia with intraventricular extension. Midline shift, but more impressive basilar cistern effacement from cerebral edema. Right hemispheric edema and loss of cortical distinction suspicious for MCA infarct. Follow-up is recommended. Density in right subarachnoid spaces may be pseudo-SAH."  NSG and Neurology consulted immediately.  Patient in the interim noted to be acutely apneic and hypotensive, requiring intubation and pressors.  Patient given Cryo, protamine, 2% Na, mannitol per Neurology recs.  Repeat CTH and CTA given acute change in mental status notable for global hypoxic ischemic encephalopathy, diffuse brainstem edema, effacement of cisterns.  On exam patient w/ blown pupils, no brainstem reflexes.    PAST MEDICAL & SURGICAL HISTORY:  HTN (hypertension)      Obesity      H/O hernia repair        FAMILY HISTORY:  FH: breast cancer (Mother)    FH: prostate cancer (Father)      SOCIAL HISTORY:  unchanged    MEDICATIONS:  norepinephrine Infusion 0.05 MICROgram(s)/kG/Min IV Continuous <Continuous>    piperacillin/tazobactam IVPB.- 3.375 Gram(s) IV Intermittent once  piperacillin/tazobactam IVPB.- 3.375 Gram(s) IV Intermittent once  piperacillin/tazobactam IVPB.. 3.375 Gram(s) IV Intermittent every 8 hours      levETIRAcetam  IVPB 500 milliGRAM(s) IV Intermittent two times a day      vasopressin Infusion 0.04 Unit(s)/Min IV Continuous <Continuous>    chlorhexidine 0.12% Liquid 15 milliLiter(s) Oral Mucosa every 12 hours  chlorhexidine 2% Cloths 1 Application(s) Topical daily  chlorhexidine 4% Liquid 1 Application(s) Topical <User Schedule>  dextrose 5%. 1000 milliLiter(s) IV Continuous <Continuous>  sodium chloride 0.9% lock flush 10 milliLiter(s) IV Push every 1 hour PRN      REVIEW OF SYSTEMS:  Unable to complete  -------------------------------------------------------------------------------------------  PHYSICAL EXAM:  T(C): 37.9 (05-20-24 @ 05:00), Max: 37.9 (05-20-24 @ 05:00)  HR: 116 (05-20-24 @ 06:30) (86 - 146)  BP: 137/73 (05-19-24 @ 13:45) (137/73 - 146/93)  RR: 19 (05-20-24 @ 06:30) (3 - 44)  SpO2: 98% (05-20-24 @ 06:30) (80% - 100%)  Wt(kg): --  I&O's Summary    19 May 2024 07:01  -  20 May 2024 06:40  --------------------------------------------------------  IN: 2363.7 mL / OUT: 8935 mL / NET: -6571.3 mL        GENERAL: Intubated  EYES: pupils dilated and unreactive bilaterally. no brainstem reflexes  CHEST/LUNG: Clear to auscultation bilaterally; No rales, rhonchi, wheezing, or rubs.  HEART: Tachycardic rate and regular rhythm      -------------------------------------------------------------------------------------------  LABS:                          13.4   12.63 )-----------( 127      ( 20 May 2024 00:27 )             40.2     05-20    158<H>  |  125<H>  |  12  ----------------------------<  147<H>  4.4   |  21<L>  |  1.57<H>    Ca    9.3      20 May 2024 04:04  Phos  1.7     05-20  Mg     2.6     05-20    TPro  6.9  /  Alb  3.7  /  TBili  1.9<H>  /  DBili  x   /  AST  24  /  ALT  18  /  AlkPhos  72  05-20    PT/INR - ( 20 May 2024 00:27 )   PT: 13.3 sec;   INR: 1.22 ratio         PTT - ( 20 May 2024 00:27 )  PTT:24.4 sec  CARDIAC MARKERS ( 19 May 2024 14:14 )  469 ng/L / x     / x     / x     / x     / x          piperacillin/tazobactam IVPB.- 3.375 Gram(s) IV Intermittent once  piperacillin/tazobactam IVPB.- 3.375 Gram(s) IV Intermittent once  piperacillin/tazobactam IVPB.. 3.375 Gram(s) IV Intermittent every 8 hours      levETIRAcetam  IVPB 500 milliGRAM(s) IV Intermittent two times a day

## 2024-05-20 NOTE — PROGRESS NOTE ADULT - SUBJECTIVE AND OBJECTIVE BOX
BLAIR SILVEIRA  MRN-[id number]  Patient is a 54y old  Male who presents with a chief complaint of Pulmonary Embolus (20 May 2024 07:34)    HPI:  Patient is a 54 year old male with a past medical history of HTN who presented to [facility] with chest pain and sob starting this morning. Per spouse, pt woke up this morning with chest pain and sob. Pt had previously been healthy without recent complaints. No recent illnesses or traveling. Wife called 911, pt received aspirin and nitroglycerin and was brought to [facility]. In the ED, pt was hypoxic, hypotensive and vomiting. EKG and bedside echo were consistent with right heart strain. CT PE showed a saddle embolus with right heart strain. Pt was placed on BiPAP and heparin gtt was begun. Pt was given 90 mg of tPA. Pt had an episode of "eye rolling" and received IV ativan 2 mg x2 due to concern for seizure activity. Pt was subsequently transferred to [facility]. In the EMS, pt was agitated and received versed 4.5.     On presentation, pt was satting well on BiPAP with elevated blood pressures. Pt was transitioned to HF NC and A-line was placed. TTE showed RV strain. ABG significant for pO2 64 and lactate 4.4. (19 May 2024 14:39)      24 HOUR EVENTS:    REVIEW OF SYSTEMS:    CONSTITUTIONAL: No weakness, fevers or chills  EYES/ENT: No visual changes;  No vertigo or throat pain   NECK: No pain or stiffness  RESPIRATORY: No cough, wheezing, hemoptysis; No shortness of breath  CARDIOVASCULAR: No chest pain or palpitations  GASTROINTESTINAL: No abdominal or epigastric pain. No nausea, vomiting, or hematemesis; No diarrhea or constipation. No melena or hematochezia.  GENITOURINARY: No dysuria, frequency or hematuria  NEUROLOGICAL: No numbness or weakness  SKIN: No itching, rashes      ICU Vital Signs Last 24 Hrs  T(C): 36.9 (20 May 2024 15:00), Max: 37.9 (20 May 2024 05:00)  T(F): 98.4 (20 May 2024 15:00), Max: 100.2 (20 May 2024 05:00)  HR: 83 (20 May 2024 19:15) (83 - 174)  BP: 111/72 (20 May 2024 15:30) (78/49 - 185/104)  BP(mean): 86 (20 May 2024 15:30) (58 - 136)  ABP: 136/78 (20 May 2024 19:15) (86/44 - 263/119)  ABP(mean): 95 (20 May 2024 19:15) (56 - 165)  RR: 22 (20 May 2024 19:15) (3 - 33)  SpO2: 96% (20 May 2024 19:15) (94% - 100%)    O2 Parameters below as of 20 May 2024 19:00  Patient On (Oxygen Delivery Method): ventilator    O2 Concentration (%): 30      Mode: AC/ CMV (Assist Control/ Continuous Mandatory Ventilation), RR (machine): 22, TV (machine): 550, FiO2: 30, PEEP: 5, ITime: 1  CVP(mm Hg): --  CO: --  CI: --  PA: --  PA(mean): --  PA(direct): --  PCWP: --  LA: --  RA: --  SVR: --  SVRI: --  PVR: --  PVRI: --  I&O's Summary    19 May 2024 07:01  -  20 May 2024 07:00  --------------------------------------------------------  IN: 2572 mL / OUT: 9055 mL / NET: -6483 mL    20 May 2024 07:01  -  20 May 2024 19:25  --------------------------------------------------------  IN: 1921.7 mL / OUT: 1345 mL / NET: 576.7 mL        CAPILLARY BLOOD GLUCOSE    CAPILLARY BLOOD GLUCOSE          PHYSICAL EXAM:  GENERAL: No acute distress, well-developed  HEAD:  Atraumatic, Normocephalic  EYES: EOMI, PERRLA, conjunctiva and sclera clear  NECK: Supple, no lymphadenopathy, no JVD  CHEST/LUNG: CTAB; No wheezes, rales, or rhonchi  HEART: Regular rate and rhythm. Normal S1/S2. No murmurs, rubs, or gallops  ABDOMEN: Soft, non-tender, non-distended; normal bowel sounds, no organomegaly  EXTREMITIES:  2+ peripheral pulses b/l, No clubbing, cyanosis, or edema  NEUROLOGY: A&O x 3, no focal deficits  SKIN: No rashes or lesions    ============================I/O===========================   I&O's Detail    19 May 2024 07:01  -  20 May 2024 07:00  --------------------------------------------------------  IN:    Cryoprecipitate: 150 mL    dextrose 5%: 300 mL    IV PiggyBack: 1000 mL    IV PiggyBack: 336.6 mL    NiCARdipine: 62.5 mL    Norepinephrine: 266.9 mL    sodium chloride 2%: 420 mL    Vasopressin: 36 mL  Total IN: 2572 mL    OUT:    FentaNYL: 0 mL    Heparin: 0 mL    Indwelling Catheter - Urethral (mL): 9055 mL  Total OUT: 9055 mL    Total NET: -6483 mL      20 May 2024 07:01  -  20 May 2024 19:25  --------------------------------------------------------  IN:    dextrose 5%: 1350 mL    IV PiggyBack: 249.9 mL    Norepinephrine: 135.8 mL    Vasopressin: 156 mL    Vital1.0: 30 mL  Total IN: 1921.7 mL    OUT:    Indwelling Catheter - Urethral (mL): 1345 mL  Total OUT: 1345 mL    Total NET: 576.7 mL        ============================ LABS =========================                        11.9   12.76 )-----------( 99       ( 20 May 2024 15:02 )             37.3     05-20    151<H>  |  119<H>  |  12  ----------------------------<  168<H>  3.6   |  21<L>  |  1.51<H>    Ca    8.7      20 May 2024 17:30  Phos  2.3     05-20  Mg     2.3     05-20    TPro  6.5  /  Alb  3.5  /  TBili  2.5<H>  /  DBili  x   /  AST  23  /  ALT  17  /  AlkPhos  55  05-20    Troponin T, High Sensitivity Result: 469 ng/L (05-19-24 @ 14:14)              LIVER FUNCTIONS - ( 20 May 2024 17:30 )  Alb: 3.5 g/dL / Pro: 6.5 g/dL / ALK PHOS: 55 U/L / ALT: 17 U/L / AST: 23 U/L / GGT: x           PT/INR - ( 20 May 2024 00:27 )   PT: 13.3 sec;   INR: 1.22 ratio         PTT - ( 20 May 2024 00:27 )  PTT:24.4 sec  ABG - ( 20 May 2024 14:55 )  pH, Arterial: 7.41  pH, Blood: x     /  pCO2: 35    /  pO2: 116   / HCO3: 22    / Base Excess: -1.9  /  SaO2: 99.4              Blood Gas Arterial, Lactate: 1.2 mmol/L (05-20-24 @ 14:55)  Blood Gas Arterial, Lactate: 1.4 mmol/L (05-20-24 @ 12:00)  Blood Gas Arterial, Lactate: 2.2 mmol/L (05-20-24 @ 09:10)  Blood Gas Arterial, Lactate: 0.8 mmol/L (05-20-24 @ 01:28)  Blood Gas Arterial, Lactate: 1.1 mmol/L (05-20-24 @ 00:17)  Blood Gas Arterial, Lactate: 3.3 mmol/L (05-19-24 @ 18:45)  Blood Gas Venous - Lactate: 8.1 mmol/L (05-19-24 @ 16:58)  Blood Gas Arterial, Lactate: 7.3 mmol/L (05-19-24 @ 15:42)  Lactate, Blood: 5.0 mmol/L (05-19-24 @ 14:14)  Blood Gas Arterial, Lactate: 4.2 mmol/L (05-19-24 @ 14:13)    Urinalysis Basic - ( 20 May 2024 17:30 )    Color: x / Appearance: x / SG: x / pH: x  Gluc: 168 mg/dL / Ketone: x  / Bili: x / Urobili: x   Blood: x / Protein: x / Nitrite: x   Leuk Esterase: x / RBC: x / WBC x   Sq Epi: x / Non Sq Epi: x / Bacteria: x      ======================Micro/Rad/Cardio=================  Telemetry: Reviewed   EKG: Reviewed  CXR: Reviewed  Culture: Reviewed   Echo:   Cath:   ======================================================  PAST MEDICAL & SURGICAL HISTORY:  HTN (hypertension)      Obesity      H/O hernia repair              ======================= LINES/TUBES  =====================  Girard: (Date placed)  Central Line: (Date placed)  HD Catheter: (Date placed)  Arterial Line: (Date placed)  Endotracheal Tube: (Date placed)  Vogel: (Date placed)  ======================= DISPOSITION  =====================  [X] Critical   [ ] Guarded    [ ] Stable    [X] Maintain in CICU  [ ] Downgrade to Telemtry  [ ] Discharge Home    Patient requires continuous monitoring with bedside rhythm monitoring, pulse ox monitoring, and intermittent blood gas analysis. Care plan discussed with ICU care team. Patient remained critical and at risk for life threatening decompensation.  Patient seen, examined and plan discussed with CCU team during rounds.     I have personally provided 30 minutes of critical care time excluding time spent on separate procedures, in addition to initial critical care time provided by the CICU Attending, Dr. Wilson/ Ramez/ Cyndy/ Deejay/ Melissa/ Ronny .       [facility] Enrique [facility] [phone]      BLAIR SILVEIRA  MRN-[id number]  Patient is a 54y old  Male who presents with a chief complaint of Pulmonary Embolus (20 May 2024 07:34)    HPI: 54M Hx HTN who presented to [facility] with chest pain and sob starting this morning. Per spouse, pt woke up this morning with chest pain and sob. Pt had previously been healthy without recent complaints. No recent illnesses or traveling. Wife called 911, pt received aspirin and nitroglycerin and was brought to [facility]. In the ED, pt was hypoxic, hypotensive and vomiting. EKG and bedside echo were consistent with right heart strain. CT PE showed a saddle embolus with right heart strain. Pt was placed on BiPAP and heparin gtt was begun. Pt was given 90 mg of tPA. Pt had an episode of "eye rolling" and received IV ativan 2 mg x2 due to concern for seizure activity. Pt was subsequently transferred to [facility]. In the EMS, pt was agitated and received versed 4.5.  On presentation, pt was satting well on BiPAP with elevated blood pressures. Pt was transitioned to HF NC and A-line was placed. TTE showed RV strain. ABG significant for pO2 64 and lactate 4.4. (19 May 2024 14:39)    CTH 5/19 3PM - Acute right-sided intraparenchymal hemorrhage, centered at basal ganglia with intraventricular extension. Midline shift, but more impressive basilar cistern effacement from cerebral edema.  Right hemispheric edema and loss of cortical distinction suspicious for MCA infarct. Follow-up is recommended. Density in right subarachnoid spaces may be pseudo-SAH.    CTH 5/19 6:30PM - No significant interval change in the intraparenchymal hematoma in the right basal nuclei, intraventricular blood, ventricular caliber, mass effect, and pseudosubarachnoid hemorrhages versus subarachnoid hemorrhages in the left frontoparietal lobes.  As before findings suspicious for a large acute right MCA distribution infarct.    CTA HEAD AND NECK 5/19 6:30PM - There is a paucity of intravascular contrast on both the initial scan and on the subsequent delayed CTA of the head and neck. Specifically, the M2 segments bilaterally, A2 segments bilaterally, and distal P2 segments bilaterally are not well opacified on either scan. This raises suspicion for global hypoxic ischemic encephalopathy.    Discussed above findings with Neurosurgery resident Dr. Urias - CTH with diffuse brainstem edema and effacement of cisterns. On exam, pupils blown, no corneals, no cough/gag, VOR.  No neurosurgical intervention.  GOC discussion w/ family given no brainstem reflexes.    5/20 Patient made DNR/DNI.  Plan for termination extubation full comfort tomorrow in the afternoon as per family's wishes.     REVIEW OF SYSTEMS:  - Unable to obtain     ICU Vital Signs Last 24 Hrs  T(C): 36.9 (20 May 2024 15:00), Max: 37.9 (20 May 2024 05:00)  T(F): 98.4 (20 May 2024 15:00), Max: 100.2 (20 May 2024 05:00)  HR: 83 (20 May 2024 19:15) (83 - 174)  BP: 111/72 (20 May 2024 15:30) (78/49 - 185/104)  BP(mean): 86 (20 May 2024 15:30) (58 - 136)  ABP: 136/78 (20 May 2024 19:15) (86/44 - 263/119)  ABP(mean): 95 (20 May 2024 19:15) (56 - 165)  RR: 22 (20 May 2024 19:15) (3 - 33)  SpO2: 96% (20 May 2024 19:15) (94% - 100%)    O2 Parameters below as of 20 May 2024 19:00  Patient On (Oxygen Delivery Method): ventilator    O2 Concentration (%): 30      Mode: AC/ CMV (Assist Control/ Continuous Mandatory Ventilation), RR (machine): 22, TV (machine): 550, FiO2: 30, PEEP: 5, ITime: 1      I&O's Summary    19 May 2024 07:01  -  20 May 2024 07:00  --------------------------------------------------------  IN: 2572 mL / OUT: 9055 mL / NET: -6483 mL    20 May 2024 07:01  -  20 May 2024 19:25  --------------------------------------------------------  IN: 1921.7 mL / OUT: 1345 mL / NET: 576.7 mL  ============================I/O===========================   I&O's Detail    19 May 2024 07:01  -  20 May 2024 07:00  --------------------------------------------------------  IN:    Cryoprecipitate: 150 mL    dextrose 5%: 300 mL    IV PiggyBack: 1000 mL    IV PiggyBack: 336.6 mL    NiCARdipine: 62.5 mL    Norepinephrine: 266.9 mL    sodium chloride 2%: 420 mL    Vasopressin: 36 mL  Total IN: 2572 mL    OUT:    FentaNYL: 0 mL    Heparin: 0 mL    Indwelling Catheter - Urethral (mL): 9055 mL  Total OUT: 9055 mL    Total NET: -6483 mL      20 May 2024 07:01  -  20 May 2024 19:25  --------------------------------------------------------  IN:    dextrose 5%: 1350 mL    IV PiggyBack: 249.9 mL    Norepinephrine: 135.8 mL    Vasopressin: 156 mL    Vital1.0: 30 mL  Total IN: 1921.7 mL    OUT:    Indwelling Catheter - Urethral (mL): 1345 mL  Total OUT: 1345 mL    Total NET: 576.7 mL  ============================ LABS =========================                     11.9   12.76 )-----------( 99       ( 20 May 2024 15:02 )             37.3     05-20    151<H>  |  119<H>  |  12  ----------------------------<  168<H>  3.6   |  21<L>  |  1.51<H>    Ca    8.7      20 May 2024 17:30  Phos  2.3     05-20  Mg     2.3     05-20    TPro  6.5  /  Alb  3.5  /  TBili  2.5<H>  /  DBili  x   /  AST  23  /  ALT  17  /  AlkPhos  55  05-20    Troponin T, High Sensitivity Result: 469 ng/L (05-19-24 @ 14:14)    LIVER FUNCTIONS - ( 20 May 2024 17:30 )  Alb: 3.5 g/dL / Pro: 6.5 g/dL / ALK PHOS: 55 U/L / ALT: 17 U/L / AST: 23 U/L / GGT: x           PT/INR - ( 20 May 2024 00:27 )   PT: 13.3 sec;   INR: 1.22 ratio    PTT - ( 20 May 2024 00:27 )  PTT:24.4 sec    ABG - ( 20 May 2024 14:55 )  pH, Arterial: 7.41  pH, Blood: x     /  pCO2: 35    /  pO2: 116   / HCO3: 22    / Base Excess: -1.9  /  SaO2: 99.4      Blood Gas Arterial, Lactate: 1.2 mmol/L (05-20-24 @ 14:55)  Blood Gas Arterial, Lactate: 1.4 mmol/L (05-20-24 @ 12:00)  Blood Gas Arterial, Lactate: 2.2 mmol/L (05-20-24 @ 09:10)  Blood Gas Arterial, Lactate: 0.8 mmol/L (05-20-24 @ 01:28)  Blood Gas Arterial, Lactate: 1.1 mmol/L (05-20-24 @ 00:17)  Blood Gas Arterial, Lactate: 3.3 mmol/L (05-19-24 @ 18:45)  Blood Gas Venous - Lactate: 8.1 mmol/L (05-19-24 @ 16:58)  Blood Gas Arterial, Lactate: 7.3 mmol/L (05-19-24 @ 15:42)  Lactate, Blood: 5.0 mmol/L (05-19-24 @ 14:14)  Blood Gas Arterial, Lactate: 4.2 mmol/L (05-19-24 @ 14:13)    Urinalysis Basic - ( 20 May 2024 17:30 )    Color: x / Appearance: x / SG: x / pH: x  Gluc: 168 mg/dL / Ketone: x  / Bili: x / Urobili: x   Blood: x / Protein: x / Nitrite: x   Leuk Esterase: x / RBC: x / WBC x   Sq Epi: x / Non Sq Epi: x / Bacteria: x  ======================Micro/Rad/Cardio=================  Telemetry: Reviewed   EKG: Reviewed  CXR: Reviewed  Culture: Reviewed   Echo: Reviewed   ======================================================  PAST MEDICAL & SURGICAL HISTORY:  HTN (hypertension)    Obesity    H/O hernia repair    A/P: 54M Hx HTN who presented from an OSH with a saddle pulmonary embolus s/p tPA on heparin drip, intubated for worsening mental status and apnea.  Course c/b right BG IPH with IVH im setting of tPA and a large acute right MCA distribution infarct.  Patient now with no brainstem reflexes.  Repeat CTH with diffuse brainstem edema and effacement of cisterns. No neurosurgical intervention per Neurosurgery team    ====================NEUROLOGY=======================  #right BG IPH with IVH im setting of tPA  #large acute right MCA distribution infarct  - repeat CTH:  No significant interval change in the intraparenchymal hematoma in the right basal nuclei  intraventricular blood, ventricular caliber, mass effect, and pseudosubarachnoid hemorrhages versus   subarachnoid hemorrhages in the left frontoparietal lobes.  As before findings suspicious for a large   acute right MCA distribution infarct  - CTA HEAD AND NECK 5/19 6:30PM - M2 segments bilaterally, A2 segments bilaterally, and distal P2   segments bilaterally are not well opacified on either scan, suspicion for global hypoxic ischemic encephalopathy  - per Neurosurgery team, CTH with diffuse brainstem edema and effacement of cisterns  - On exam, pupils blown, no corneals, no cough/gag, VOR  - No neurosurgical intervention per Neurosurgery team  - GOC discussion w/ family given no brainstem reflexes  - Wife states "waiting on her daughter from [place] before withdrawing care"  - Continue with medical management, DC cryo, no further blood products - as discussed with wife   - 5/20 patient made DNR/DNI - plan for terminal extubation full comfort tomorrow afternoon with family present    ====================RESPIRATORY======================  #Saddle pulmonary embolism   - Pt presented to OSH with sob and chest pain  - CT PE showed saddle pulmonary embolus with RV strain  - Pt received 90 mg tPA (infusion completed at 11:20) and was started on heparin gtt  - Pt initially on BiPAP, transitioned to HFNC, satting 90 - intubated due to worsening mental status   - TTE with RV strain and RVOT Doppler profile consistent with an elevated pulmonary vascular resistance  - hold heparin iso brain hemorrhage, initial lactate 7.3 -> 3.3 -> 1.2  - GOC as above     ====================CARDIOVASCULAR==================  #Right heart strain due to saddle PE  #Hypotension   - c/w with Levophed   ====================/RENAL========================  Hypernatremia   - Trend Na q2h   - On D5W @ 125 cc/hr -> increased to 200/hr for now, will recheck Na (still remains 151)  - Goal Na 145-150     ====================GI/NUTRITION=====================  - c/w TF, PPI  ====================INFECTIOUS DISEASE================  #Febrile   - BCx sent, f/u infectious w/u  - C/w Zosyn    ====================ENDOCRINE=======================  JAZMIN    ====================HEMATOLOGIC/DVT PPx=============  #right BG IPH with IVH im setting of tPA  - as above     =====================SKIN===========================  R IJ triple lumen Central line - 5/19/2024   L radial A-line - 5/19/2024     ====================ETHICS===========================  Patient is now DNR/DNI as per family wishes, Molst placed in chart  Plan for terminal extubation full comfort tomorrow afternoon per wife   Organ donation team called to make them aware of plan - Organ donation will meet with family in the AM    ======================= DISPOSITION  =====================  [X] Critical   [ ] Guarded    [ ] Stable    [X] Maintain in CICU  [ ] Downgrade to Telemetry  [ ] Discharge Home    Patient requires continuous monitoring with bedside rhythm monitoring, pulse ox monitoring, and intermittent blood gas analysis. Care plan discussed with ICU care team. Patient remained critical and at risk for life threatening decompensation.  Patient seen, examined and plan discussed with CCU team during rounds.     I have personally provided 30 minutes of critical care time excluding time spent on separate procedures, in addition to initial critical care time provided by the CICU Attending, Dr. Ankur Enrique [facility] [phone]

## 2024-05-21 NOTE — AIRWAY REMOVAL NOTE  ADULT & PEDS - ARTIFICAL AIRWAY REMOVAL COMMENTS
Written order for extubation verified. The patient was identified by full name and birth date compared to the identification band. Present during the procedure was (RN, Pat)

## 2024-05-21 NOTE — CHART NOTE - NSCHARTNOTEFT_GEN_A_CORE
DEATH NOTE    Called to bedside to evaluate the patient for asystole on telemetry.     On physical exam, patient did not respond to verbal or noxious stimuli.  No spontaneous respirations.  Absent heart and breath sounds.  Absent radial and carotid pulses.   Pupils are fixed and dilated, no corneal reflex.  EKG rhythm strip shows asystole.   Patient pronounced dead at 16:39.  Attending notified.  Family declined autopsy. DEATH NOTE    Patient is a 54 year old male with a past medical history of HTN who presented to [facility] with chest pain and sob starting this morning. Per spouse, pt woke up this morning with chest pain and sob. Pt had previously been healthy without recent complaints. No recent illnesses or traveling. Wife called 911, pt received aspirin and nitroglycerin and was brought to [facility]. In the ED, pt was hypoxic, hypotensive and vomiting. EKG and bedside echo were consistent with right heart strain. CT PE showed a saddle embolus with right heart strain. Pt was placed on BiPAP and heparin gtt was begun. Pt was given 90 mg of tPA. Pt had an episode of "eye rolling" and received IV ativan 2 mg x2 due to concern for seizure activity. Pt was subsequently transferred to [facility]. In the EMS, pt was agitated and received versed 4.5.   On presentation, pt was satting well on BiPAP with elevated blood pressures. Pt was transitioned to HF NC and A-line was placed. TTE showed RV strain. ABG significant for pO2 64 and lactate 4.4.    In the CICU, neurosurgery was following and recommended no surgical intervention and GOC discussion due to poor prognosis. Patient found to have no brainstem reflexes. GOC discussion with family led to patient being made DNR/DNI. Patient was made comfort care and planned for compassionate extubation on 5/21. After compassionate extubation, patient found to be asystolic at 16:39 PM on 5/21/2024.     Called to bedside to evaluate the patient for asystole on telemetry.     On physical exam, patient did not respond to verbal or noxious stimuli.  No spontaneous respirations.  Absent heart and breath sounds.  Absent radial and carotid pulses.   Pupils are fixed and dilated, no corneal reflex.  EKG rhythm strip shows asystole.   Patient pronounced dead at 16:39.  Attending notified.  Family declined autopsy.

## 2024-05-21 NOTE — CHART NOTE - NSCHARTNOTESELECT_GEN_ALL_CORE
Hypernatremia/Event Note
Event Note
Event Note
Hypothermic, hypernatremia/Event Note
Neurosurgery/Event Note

## 2024-05-21 NOTE — DISCHARGE NOTE FOR THE EXPIRED PATIENT - HOSPITAL COURSE
Patient is a 54 year old male with a past medical history of HTN who presented to [facility] with chest pain and sob starting this morning. Per spouse, pt woke up this morning with chest pain and sob. Pt had previously been healthy without recent complaints. No recent illnesses or traveling. Wife called 911, pt received aspirin and nitroglycerin and was brought to [facility]. In the ED, pt was hypoxic, hypotensive and vomiting. EKG and bedside echo were consistent with right heart strain. CT PE showed a saddle embolus with right heart strain. Pt was placed on BiPAP and heparin gtt was begun. Pt was given 90 mg of tPA. Pt had an episode of "eye rolling" and received IV ativan 2 mg x2 due to concern for seizure activity. Pt was subsequently transferred to [facility]. In the EMS, pt was agitated and received versed 4.5.   On presentation, pt was satting well on BiPAP with elevated blood pressures. Pt was transitioned to HF NC and A-line was placed. TTE showed RV strain. ABG significant for pO2 64 and lactate 4.4.    In the CICU, neurosurgery was following and recommended no surgical intervention and GOC discussion due to poor prognosis. Patient found to have no brainstem reflexes. GOC discussion with family led to patient being made DNR/DNI. Patient was made comfort care and planned for compassionate extubation on 5/21. After compassionate extubation, patient found to be asystolic at 16:39 PM on 5/21/2024.

## 2024-05-21 NOTE — PROGRESS NOTE ADULT - ASSESSMENT
54M Hx HTN who presented from an OSH with a saddle pulmonary embolus s/p tPA on heparin drip, intubated for worsening mental status and apnea.  Course c/b right BG IPH with IVH im setting of tPA and a large acute right MCA distribution infarct.  Patient now with no brainstem reflexes.  Repeat CTH with diffuse brainstem edema and effacement of cisterns. No neurosurgical intervention per Neurosurgery team    ====================NEUROLOGY=======================  #right BG IPH with IVH im setting of tPA  #large acute right MCA distribution infarct  - repeat CTH:  No significant interval change in the intraparenchymal hematoma in the right basal nuclei  intraventricular blood, ventricular caliber, mass effect, and pseudosubarachnoid hemorrhages versus   subarachnoid hemorrhages in the left frontoparietal lobes.  As before findings suspicious for a large   acute right MCA distribution infarct  - CTA HEAD AND NECK 5/19 6:30PM - M2 segments bilaterally, A2 segments bilaterally, and distal P2   segments bilaterally are not well opacified on either scan, suspicion for global hypoxic ischemic encephalopathy  - per Neurosurgery team, CTH with diffuse brainstem edema and effacement of cisterns  - On exam, pupils blown, no corneals, no cough/gag, VOR  - No neurosurgical intervention per Neurosurgery team  - GOC discussion w/ family given no brainstem reflexes  - Wife states "waiting on her daughter from [place] before withdrawing care"  - Continue with medical management, DC cryo, no further blood products - as discussed with wife     ====================RESPIRATORY======================  #Saddle pulmonary embolism   - Pt presented to OSH with sob and chest pain  - CT PE showed saddle pulmonary embolus with RV strain  - Pt received 90 mg tPA (infusion completed at 11:20) and was started on heparin gtt  - Pt initially on BiPAP, transitioned to HFNC, satting 90 - intubated due to worsening mental status   - TTE with RV strain and RVOT Doppler profile consistent with an elevated pulmonary vascular resistance  - hold heparin iso brain hemorrhage, initial lactate 7.3 -> 3.3  - GOC as above     ====================CARDIOVASCULAR==================  #Right heart strain due to saddle PE  #Hypotension   - c/w with Levophed   ====================/RENAL========================  Hypernatremia   - Trend Na q2h   - On D5W @ 100 cc/hr   - Goal Na 145-150     ====================GI/NUTRITION=====================  NPO   - Plan to place OGT today and give tube feeds     ====================INFECTIOUS DISEASE================  #Febrile   - BCx sent, pending   - C/w Zosyn   - Send MRSA swab, hold vanc for now     ====================ENDOCRINE=======================  JAZMIN    ====================HEMATOLOGIC/DVT PPx=============  #right BG IPH with IVH im setting of tPA  - as above     =====================SKIN===========================  R IJ triple lumen Central line - 5/19/2024   L radial A-line - 5/19/2024     ====================ETHICS===========================  FULL CODE at this time although wife is aware patient without brainstem reflexes with no neurosurgical intervention  - HCP (Vikgiovannavalentin Aguilar - wife) states she is awaiting on her daughter who is in flight from [place] before withdrawing care  - DC additional cryo ordered, no further blood products as discussed with wife    54M Hx HTN who presented from an OSH with a saddle pulmonary embolus s/p tPA on heparin drip, intubated for worsening mental status and apnea.  Course c/b right BG IPH with IVH im setting of tPA and a large acute right MCA distribution infarct.  Patient now with no brainstem reflexes.  Repeat CTH with diffuse brainstem edema and effacement of cisterns. No neurosurgical intervention per Neurosurgery team    ====================NEUROLOGY=======================  #right BG IPH with IVH im setting of tPA  #large acute right MCA distribution infarct  - repeat CTH:  No significant interval change in the intraparenchymal hematoma in the right basal nuclei  intraventricular blood, ventricular caliber, mass effect, and pseudosubarachnoid hemorrhages versus   subarachnoid hemorrhages in the left frontoparietal lobes.  As before findings suspicious for a large   acute right MCA distribution infarct  - CTA HEAD AND NECK 5/19 6:30PM - M2 segments bilaterally, A2 segments bilaterally, and distal P2   segments bilaterally are not well opacified on either scan, suspicion for global hypoxic ischemic encephalopathy  - per Neurosurgery team, CTH with diffuse brainstem edema and effacement of cisterns  - On exam, pupils blown, no corneals, no cough/gag, VOR  - No neurosurgical intervention per Neurosurgery team  - GOC discussion w/ family given no brainstem reflexes  - Wife states "waiting on her daughter from [place] before withdrawing care"  - Continue with medical management, DC cryo, no further blood products - as discussed with wife     ====================RESPIRATORY======================  #Saddle pulmonary embolism   - Pt presented to OSH with sob and chest pain  - CT PE showed saddle pulmonary embolus with RV strain  - Pt received 90 mg tPA (infusion completed at 11:20) and was started on heparin gtt  - Pt initially on BiPAP, transitioned to HFNC, satting 90 - intubated due to worsening mental status   - TTE with RV strain and RVOT Doppler profile consistent with an elevated pulmonary vascular resistance  - hold heparin iso brain hemorrhage, initial lactate 7.3 -> 3.3  - GOC as above     ====================CARDIOVASCULAR==================  #Right heart strain due to saddle PE  #Hypotension   - c/w with Levophed   ====================/RENAL========================  Hypernatremia   - Trend Na q2h   - Was on D5W @ 100 cc/hr, switch to PO free water flushed via OGT   - Goal Na 145-150     ====================GI/NUTRITION=====================  Now tolerating tube feeds     ====================INFECTIOUS DISEASE================  #Febrile   - BCx sent, pending   - D/C Zosyn   - MRSA swab negative, hold vanc     ====================ENDOCRINE=======================  JAZMIN    ====================HEMATOLOGIC/DVT PPx=============  #right BG IPH with IVH im setting of tPA  - as above     =====================SKIN===========================  R IJ triple lumen Central line - 5/19/2024   L radial A-line - 5/19/2024     ====================ETHICS===========================  DNR/DNI signed   - HCP (Yared Aguilar - wife) states she is awaiting on her daughter who is in flight from [place] before withdrawing care  - DC additional cryo ordered, no further blood products as discussed with wife

## 2024-05-21 NOTE — PROGRESS NOTE ADULT - ATTENDING COMMENTS
54M a/w massive unstable PE s/p tpa c/b intracranial hemorrhage with evidence of herniation.   Neuro: Intracranial hemorrhage from tpa with evidence of herniation, no surgical intervention per NSGY. Only brainstem reflex intact is overbreathing the vent, not consistent with brain death. Continue to hold sedation. Maintain Na 145-150, HOB 45 degrees. Ongoing GOC discussion with family.  Resp: Minimal O2 requirements, keep intubated given ICH  CVS: Vasoplegic requiring pressor support, wean as tolerated, maintain MAP at 65, lactate cleared  Renal: Polyuria, unclear if component of central DI. UOP decreasing, D5 at 100cc/hr. c/w vasopressin as above  GI: Start tube feeds, bowel regimen PRN  Endo: monitor FS, ISS  Heme: s/p tpa, hold any pharm ac   ID: Empiric Zosyn, f/u cultures  [facility] 5/19
54M a/w massive unstable PE s/p tpa c/b intracranial hemorrhage with evidence of herniation.  Neuro: Intracranial hemorrhage from tpa with evidence of herniation, no surgical intervention per NSGY. Only brainstem reflex intact is overbreathing the vent, not consistent with brain death. Continue to hold sedation. Maintain Na 145-150, HOB 45 degrees. DNR/DNI, plan for withdrawal of care this afternoon  Resp: Minimal O2 requirements, keep intubated given ICH  CVS: Vasoplegic requiring pressor support, wean as tolerated, maintain MAP at 65, lactate cleared  Renal: Hypernatremia improving, c/w D5  GI: c/w tube feeds, bowel regimen PRN  Endo: monitor FS, ISS  Heme: s/p tpa, hold any pharm ac  ID: Empiric Zosyn, f/u cultures  [facility] 5/19 .

## 2024-05-21 NOTE — PROGRESS NOTE ADULT - SUBJECTIVE AND OBJECTIVE BOX
Patient is a 54y old  Male who presents with a chief complaint of Pulmonary Embolus (20 May 2024 19:25)    HPI:  Patient is a 54 year old male with a past medical history of HTN who presented to [facility] with chest pain and sob starting this morning. Per spouse, pt woke up this morning with chest pain and sob. Pt had previously been healthy without recent complaints. No recent illnesses or traveling. Wife called 911, pt received aspirin and nitroglycerin and was brought to [facility]. In the ED, pt was hypoxic, hypotensive and vomiting. EKG and bedside echo were consistent with right heart strain. CT PE showed a saddle embolus with right heart strain. Pt was placed on BiPAP and heparin gtt was begun. Pt was given 90 mg of tPA. Pt had an episode of "eye rolling" and received IV ativan 2 mg x2 due to concern for seizure activity. Pt was subsequently transferred to [facility]. In the EMS, pt was agitated and received versed 4.5.     On presentation, pt was satting well on BiPAP with elevated blood pressures. Pt was transitioned to HF NC and A-line was placed. TTE showed RV strain. ABG significant for pO2 64 and lactate 4.4. (19 May 2024 14:39)       INTERVAL HPI/OVERNIGHT EVENTS:   No overnight events   Afebrile, hemodynamically stable     Subjective:    REVIEW OF SYSTEMS:    CONSTITUTIONAL: No weakness, fevers or chills  EYES/ENT: No visual changes;  No vertigo or throat pain   NECK: No pain or stiffness  RESPIRATORY: No cough, wheezing, hemoptysis; No shortness of breath  CARDIOVASCULAR: No chest pain or palpitations  GASTROINTESTINAL: No abdominal or epigastric pain. No nausea, vomiting, or hematemesis; No diarrhea or constipation. No melena or hematochezia.  GENITOURINARY: No dysuria, frequency or hematuria  NEUROLOGICAL: No numbness or weakness  SKIN: No itching, rashes    ICU Vital Signs Last 24 Hrs  T(C): 36.2 (21 May 2024 03:00), Max: 36.9 (20 May 2024 15:00)  T(F): 97.2 (21 May 2024 03:00), Max: 98.4 (20 May 2024 15:00)  HR: 75 (21 May 2024 06:45) (75 - 174)  BP: 111/72 (20 May 2024 15:30) (78/49 - 185/104)  BP(mean): 86 (20 May 2024 15:30) (58 - 136)  ABP: 126/69 (21 May 2024 06:45) (93/51 - 263/119)  ABP(mean): 88 (21 May 2024 06:45) (61 - 165)  RR: 22 (21 May 2024 06:45) (10 - 31)  SpO2: 97% (21 May 2024 06:45) (94% - 99%)    O2 Parameters below as of 21 May 2024 06:00  Patient On (Oxygen Delivery Method): ventilator          I&O's Summary    20 May 2024 07:01  -  21 May 2024 07:00  --------------------------------------------------------  IN: 5038.5 mL / OUT: 2370 mL / NET: 2668.5 mL      Mode: AC/ CMV (Assist Control/ Continuous Mandatory Ventilation)  RR (machine): 22  TV (machine): 550  FiO2: 30  PEEP: 5  ITime: 1  MAP: 11  PIP: 19      Daily     Daily Weight in k (21 May 2024 05:00)    Adult Advanced Hemodynamics Last 24 Hrs  CVP(mm Hg): --  CVP(cm H2O): --  CO: --  CI: --  PA: --  PA(mean): --  PCWP: --  SVR: --  SVRI: --  PVR: --  PVRI: --    EKG/Telemetry Events:    MEDICATIONS  (STANDING):  chlorhexidine 0.12% Liquid 15 milliLiter(s) Oral Mucosa every 12 hours  chlorhexidine 2% Cloths 1 Application(s) Topical daily  chlorhexidine 4% Liquid 1 Application(s) Topical <User Schedule>  dextrose 5%. 1000 milliLiter(s) (150 mL/Hr) IV Continuous <Continuous>  levETIRAcetam  IVPB 500 milliGRAM(s) IV Intermittent two times a day  norepinephrine Infusion 0.05 MICROgram(s)/kG/Min (10.6 mL/Hr) IV Continuous <Continuous>  pantoprazole  Injectable 40 milliGRAM(s) IV Push daily  piperacillin/tazobactam IVPB.. 3.375 Gram(s) IV Intermittent every 8 hours  potassium chloride  20 mEq/100 mL IVPB 20 milliEquivalent(s) IV Intermittent every 4 hours  vasopressin Infusion 0.04 Unit(s)/Min (6 mL/Hr) IV Continuous <Continuous>    MEDICATIONS  (PRN):  sodium chloride 0.9% lock flush 10 milliLiter(s) IV Push every 1 hour PRN Pre/post blood products, medications, blood draw, and to maintain line patency      PHYSICAL EXAM:  GENERAL:   HEAD:  Atraumatic, Normocephalic  EYES: EOMI, PERRLA, conjunctiva and sclera clear  NECK: Supple, No JVD, Normal thyroid, no enlarged nodes  NERVOUS SYSTEM:  Alert & Awake.   CHEST/LUNG: B/L good air entry; No rales, rhonchi, or wheezing  HEART: S1S2 normal, no S3, Regular rate and rhythm; No murmurs  ABDOMEN: Soft, Nontender, Nondistended; Bowel sounds present  EXTREMITIES:  2+ Peripheral Pulses, No clubbing, cyanosis, or edema  LYMPH: No lymphadenopathy noted  SKIN: No rashes or lesions    LABS:                        11.0   12.23 )-----------( 79       ( 21 May 2024 00:16 )             34.5     05-    146<H>  |  114<H>  |  13  ----------------------------<  174<H>  3.4<L>   |  21<L>  |  1.36<H>    Ca    8.7      21 May 2024 04:41  Phos  2.6     05-  Mg     2.1     -    TPro  6.2  /  Alb  3.2<L>  /  TBili  2.6<H>  /  DBili  x   /  AST  17  /  ALT  17  /  AlkPhos  51  -21    LIVER FUNCTIONS - ( 21 May 2024 04:41 )  Alb: 3.2 g/dL / Pro: 6.2 g/dL / ALK PHOS: 51 U/L / ALT: 17 U/L / AST: 17 U/L / GGT: x           PT/INR - ( 21 May 2024 00:16 )   PT: 15.0 sec;   INR: 1.44 ratio         PTT - ( 21 May 2024 00:16 )  PTT:22.0 sec  CAPILLARY BLOOD GLUCOSE        ABG - ( 21 May 2024 00:04 )  pH, Arterial: 7.42  pH, Blood: x     /  pCO2: 34    /  pO2: 110   / HCO3: 22    / Base Excess: -1.9  /  SaO2: 99.4                    Urinalysis Basic - ( 21 May 2024 04:41 )    Color: x / Appearance: x / SG: x / pH: x  Gluc: 174 mg/dL / Ketone: x  / Bili: x / Urobili: x   Blood: x / Protein: x / Nitrite: x   Leuk Esterase: x / RBC: x / WBC x   Sq Epi: x / Non Sq Epi: x / Bacteria: x          RADIOLOGY & ADDITIONAL TESTS:  CXR:        Care Discussed with Consultants/Other Providers [ x] YES  [ ] NO       Patient is a 54y old  Male who presents with a chief complaint of Pulmonary Embolus (20 May 2024 19:25)    HPI:  Patient is a 54 year old male with a past medical history of HTN who presented to [facility] with chest pain and sob starting this morning. Per spouse, pt woke up this morning with chest pain and sob. Pt had previously been healthy without recent complaints. No recent illnesses or traveling. Wife called 911, pt received aspirin and nitroglycerin and was brought to [facility]. In the ED, pt was hypoxic, hypotensive and vomiting. EKG and bedside echo were consistent with right heart strain. CT PE showed a saddle embolus with right heart strain. Pt was placed on BiPAP and heparin gtt was begun. Pt was given 90 mg of tPA. Pt had an episode of "eye rolling" and received IV ativan 2 mg x2 due to concern for seizure activity. Pt was subsequently transferred to [facility]. In the EMS, pt was agitated and received versed 4.5.     On presentation, pt was satting well on BiPAP with elevated blood pressures. Pt was transitioned to HF NC and A-line was placed. TTE showed RV strain. ABG significant for pO2 64 and lactate 4.4. (19 May 2024 14:39)       INTERVAL HPI/OVERNIGHT EVENTS:   Made DNR/DNI overnight   Plan for terminal extubation today   Afebrile, hemodynamically stable     Subjective:    REVIEW OF SYSTEMS:    Unable to assess ROS due to brain injury     ICU Vital Signs Last 24 Hrs  T(C): 36.2 (21 May 2024 03:00), Max: 36.9 (20 May 2024 15:00)  T(F): 97.2 (21 May 2024 03:00), Max: 98.4 (20 May 2024 15:00)  HR: 75 (21 May 2024 06:45) (75 - 174)  BP: 111/72 (20 May 2024 15:30) (78/49 - 185/104)  BP(mean): 86 (20 May 2024 15:30) (58 - 136)  ABP: 126/69 (21 May 2024 06:45) (93/51 - 263/119)  ABP(mean): 88 (21 May 2024 06:45) (61 - 165)  RR: 22 (21 May 2024 06:45) (10 - 31)  SpO2: 97% (21 May 2024 06:45) (94% - 99%)    O2 Parameters below as of 21 May 2024 06:00  Patient On (Oxygen Delivery Method): ventilator          I&O's Summary    20 May 2024 07:01  -  21 May 2024 07:00  --------------------------------------------------------  IN: 5038.5 mL / OUT: 2370 mL / NET: 2668.5 mL      Mode: AC/ CMV (Assist Control/ Continuous Mandatory Ventilation)  RR (machine): 22  TV (machine): 550  FiO2: 30  PEEP: 5  ITime: 1  MAP: 11  PIP: 19      Daily     Daily Weight in k (21 May 2024 05:00)    Adult Advanced Hemodynamics Last 24 Hrs  CVP(mm Hg): --  CVP(cm H2O): --  CO: --  CI: --  PA: --  PA(mean): --  PCWP: --  SVR: --  SVRI: --  PVR: --  PVRI: --    EKG/Telemetry Events:    MEDICATIONS  (STANDING):  chlorhexidine 0.12% Liquid 15 milliLiter(s) Oral Mucosa every 12 hours  chlorhexidine 2% Cloths 1 Application(s) Topical daily  chlorhexidine 4% Liquid 1 Application(s) Topical <User Schedule>  dextrose 5%. 1000 milliLiter(s) (150 mL/Hr) IV Continuous <Continuous>  levETIRAcetam  IVPB 500 milliGRAM(s) IV Intermittent two times a day  norepinephrine Infusion 0.05 MICROgram(s)/kG/Min (10.6 mL/Hr) IV Continuous <Continuous>  pantoprazole  Injectable 40 milliGRAM(s) IV Push daily  piperacillin/tazobactam IVPB.. 3.375 Gram(s) IV Intermittent every 8 hours  potassium chloride  20 mEq/100 mL IVPB 20 milliEquivalent(s) IV Intermittent every 4 hours  vasopressin Infusion 0.04 Unit(s)/Min (6 mL/Hr) IV Continuous <Continuous>    MEDICATIONS  (PRN):  sodium chloride 0.9% lock flush 10 milliLiter(s) IV Push every 1 hour PRN Pre/post blood products, medications, blood draw, and to maintain line patency      PHYSICAL EXAM:  GENERAL: intubated   HEAD:  Atraumatic, Normocephalic  EYES: pupils dilated and non-responsive   NECK: Supple, No JVD, Normal thyroid, no enlarged nodes  NERVOUS SYSTEM:  Alert & Awake.   CHEST/LUNG: B/L good air entry; No rales, rhonchi, or wheezing  HEART: S1S2 normal, no S3, Regular rate and rhythm; No murmurs  ABDOMEN: Soft, Nontender, Nondistended; Bowel sounds present  EXTREMITIES:  2+ Peripheral Pulses, +1 b/l LE edema  LYMPH: No lymphadenopathy noted  SKIN: No rashes or lesions    LABS:                        11.0   12.23 )-----------( 79       ( 21 May 2024 00:16 )             34.5     05-21    146<H>  |  114<H>  |  13  ----------------------------<  174<H>  3.4<L>   |  21<L>  |  1.36<H>    Ca    8.7      21 May 2024 04:41  Phos  2.6     05-21  Mg     2.1     05-21    TPro  6.2  /  Alb  3.2<L>  /  TBili  2.6<H>  /  DBili  x   /  AST  17  /  ALT  17  /  AlkPhos  51  05-21    LIVER FUNCTIONS - ( 21 May 2024 04:41 )  Alb: 3.2 g/dL / Pro: 6.2 g/dL / ALK PHOS: 51 U/L / ALT: 17 U/L / AST: 17 U/L / GGT: x           PT/INR - ( 21 May 2024 00:16 )   PT: 15.0 sec;   INR: 1.44 ratio         PTT - ( 21 May 2024 00:16 )  PTT:22.0 sec  CAPILLARY BLOOD GLUCOSE        ABG - ( 21 May 2024 00:04 )  pH, Arterial: 7.42  pH, Blood: x     /  pCO2: 34    /  pO2: 110   / HCO3: 22    / Base Excess: -1.9  /  SaO2: 99.4                    Urinalysis Basic - ( 21 May 2024 04:41 )    Color: x / Appearance: x / SG: x / pH: x  Gluc: 174 mg/dL / Ketone: x  / Bili: x / Urobili: x   Blood: x / Protein: x / Nitrite: x   Leuk Esterase: x / RBC: x / WBC x   Sq Epi: x / Non Sq Epi: x / Bacteria: x          RADIOLOGY & ADDITIONAL TESTS:  CXR:        Care Discussed with Consultants/Other Providers [ x] YES  [ ] NO

## 2024-05-25 LAB
CULTURE RESULTS: SIGNIFICANT CHANGE UP
CULTURE RESULTS: SIGNIFICANT CHANGE UP
SPECIMEN SOURCE: SIGNIFICANT CHANGE UP
SPECIMEN SOURCE: SIGNIFICANT CHANGE UP
